# Patient Record
Sex: MALE | Race: OTHER | HISPANIC OR LATINO | Employment: UNEMPLOYED | URBAN - METROPOLITAN AREA
[De-identification: names, ages, dates, MRNs, and addresses within clinical notes are randomized per-mention and may not be internally consistent; named-entity substitution may affect disease eponyms.]

---

## 2023-03-02 ENCOUNTER — APPOINTMENT (OUTPATIENT)
Dept: CT IMAGING | Facility: HOSPITAL | Age: 63
End: 2023-03-02
Payer: MEDICAID

## 2023-03-02 ENCOUNTER — APPOINTMENT (OUTPATIENT)
Dept: GENERAL RADIOLOGY | Facility: HOSPITAL | Age: 63
End: 2023-03-02
Payer: MEDICAID

## 2023-03-02 ENCOUNTER — HOSPITAL ENCOUNTER (EMERGENCY)
Facility: HOSPITAL | Age: 63
Discharge: HOME | End: 2023-03-02
Attending: EMERGENCY MEDICINE | Admitting: EMERGENCY MEDICINE
Payer: MEDICAID

## 2023-03-02 VITALS
RESPIRATION RATE: 16 BRPM | SYSTOLIC BLOOD PRESSURE: 112 MMHG | HEART RATE: 82 BPM | WEIGHT: 131.17 LBS | TEMPERATURE: 97.7 F | DIASTOLIC BLOOD PRESSURE: 66 MMHG | OXYGEN SATURATION: 99 % | BODY MASS INDEX: 21.85 KG/M2 | HEIGHT: 65 IN

## 2023-03-02 DIAGNOSIS — R07.9 CHEST PAIN, UNSPECIFIED TYPE: Primary | ICD-10-CM

## 2023-03-02 LAB
ALBUMIN SERPL-MCNC: 4.7 G/DL (ref 3.5–5.2)
ALBUMIN/GLOB SERPL: 2 G/DL
ALP SERPL-CCNC: 50 U/L (ref 39–117)
ALT SERPL W P-5'-P-CCNC: 30 U/L (ref 1–41)
ANION GAP SERPL CALCULATED.3IONS-SCNC: 20.1 MMOL/L (ref 5–15)
ANISOCYTOSIS BLD QL: NORMAL
AST SERPL-CCNC: 49 U/L (ref 1–40)
BASOPHILS # BLD AUTO: 0.02 10*3/MM3 (ref 0–0.2)
BASOPHILS NFR BLD AUTO: 0.3 % (ref 0–1.5)
BILIRUB SERPL-MCNC: 0.9 MG/DL (ref 0–1.2)
BUN SERPL-MCNC: 6 MG/DL (ref 8–23)
BUN/CREAT SERPL: 10.9 (ref 7–25)
CALCIUM SPEC-SCNC: 8.5 MG/DL (ref 8.6–10.5)
CHLORIDE SERPL-SCNC: 93 MMOL/L (ref 98–107)
CO2 SERPL-SCNC: 18.9 MMOL/L (ref 22–29)
CREAT SERPL-MCNC: 0.55 MG/DL (ref 0.76–1.27)
D DIMER PPP FEU-MCNC: 2.3 MCGFEU/ML (ref 0–0.63)
DEPRECATED RDW RBC AUTO: 63.9 FL (ref 37–54)
EGFRCR SERPLBLD CKD-EPI 2021: 111.4 ML/MIN/1.73
EOSINOPHIL # BLD AUTO: 0.1 10*3/MM3 (ref 0–0.4)
EOSINOPHIL NFR BLD AUTO: 1.4 % (ref 0.3–6.2)
ERYTHROCYTE [DISTWIDTH] IN BLOOD BY AUTOMATED COUNT: 13.5 % (ref 12.3–15.4)
GEN 5 2HR TROPONIN T REFLEX: 6 NG/L
GLOBULIN UR ELPH-MCNC: 2.3 GM/DL
GLUCOSE SERPL-MCNC: 77 MG/DL (ref 65–99)
HCT VFR BLD AUTO: 28 % (ref 37.5–51)
HGB BLD-MCNC: 11.2 G/DL (ref 13–17.7)
HOLD SPECIMEN: NORMAL
HOLD SPECIMEN: NORMAL
IMM GRANULOCYTES # BLD AUTO: 0.02 10*3/MM3 (ref 0–0.05)
IMM GRANULOCYTES NFR BLD AUTO: 0.3 % (ref 0–0.5)
LIPASE SERPL-CCNC: 28 U/L (ref 13–60)
LYMPHOCYTES # BLD AUTO: 2 10*3/MM3 (ref 0.7–3.1)
LYMPHOCYTES NFR BLD AUTO: 28.8 % (ref 19.6–45.3)
MACROCYTES BLD QL SMEAR: NORMAL
MAGNESIUM SERPL-MCNC: 1.9 MG/DL (ref 1.6–2.4)
MCH RBC QN AUTO: 51.6 PG (ref 26.6–33)
MCHC RBC AUTO-ENTMCNC: 40 G/DL (ref 31.5–35.7)
MCV RBC AUTO: 129 FL (ref 79–97)
MONOCYTES # BLD AUTO: 0.63 10*3/MM3 (ref 0.1–0.9)
MONOCYTES NFR BLD AUTO: 9.1 % (ref 5–12)
NEUTROPHILS NFR BLD AUTO: 4.17 10*3/MM3 (ref 1.7–7)
NEUTROPHILS NFR BLD AUTO: 60.1 % (ref 42.7–76)
NRBC BLD AUTO-RTO: 0.4 /100 WBC (ref 0–0.2)
NT-PROBNP SERPL-MCNC: <36 PG/ML (ref 0–900)
PLAT MORPH BLD: NORMAL
PLATELET # BLD AUTO: 124 10*3/MM3 (ref 140–450)
PMV BLD AUTO: 10.9 FL (ref 6–12)
POTASSIUM SERPL-SCNC: 3.9 MMOL/L (ref 3.5–5.2)
PROT SERPL-MCNC: 7 G/DL (ref 6–8.5)
RBC # BLD AUTO: 2.17 10*6/MM3 (ref 4.14–5.8)
SODIUM SERPL-SCNC: 132 MMOL/L (ref 136–145)
TROPONIN T DELTA: NORMAL
TROPONIN T SERPL HS-MCNC: <6 NG/L
WBC MORPH BLD: NORMAL
WBC NRBC COR # BLD: 6.94 10*3/MM3 (ref 3.4–10.8)
WHOLE BLOOD HOLD COAG: NORMAL
WHOLE BLOOD HOLD SPECIMEN: NORMAL

## 2023-03-02 PROCEDURE — 85025 COMPLETE CBC W/AUTO DIFF WBC: CPT

## 2023-03-02 PROCEDURE — 85379 FIBRIN DEGRADATION QUANT: CPT | Performed by: EMERGENCY MEDICINE

## 2023-03-02 PROCEDURE — 83690 ASSAY OF LIPASE: CPT

## 2023-03-02 PROCEDURE — 83735 ASSAY OF MAGNESIUM: CPT

## 2023-03-02 PROCEDURE — 36415 COLL VENOUS BLD VENIPUNCTURE: CPT

## 2023-03-02 PROCEDURE — 71045 X-RAY EXAM CHEST 1 VIEW: CPT

## 2023-03-02 PROCEDURE — 93010 ELECTROCARDIOGRAM REPORT: CPT | Performed by: INTERNAL MEDICINE

## 2023-03-02 PROCEDURE — 84484 ASSAY OF TROPONIN QUANT: CPT

## 2023-03-02 PROCEDURE — 71260 CT THORAX DX C+: CPT

## 2023-03-02 PROCEDURE — 0 IOHEXOL PER 1 ML: Performed by: EMERGENCY MEDICINE

## 2023-03-02 PROCEDURE — 80053 COMPREHEN METABOLIC PANEL: CPT

## 2023-03-02 PROCEDURE — 85007 BL SMEAR W/DIFF WBC COUNT: CPT

## 2023-03-02 PROCEDURE — 99284 EMERGENCY DEPT VISIT MOD MDM: CPT

## 2023-03-02 PROCEDURE — 93005 ELECTROCARDIOGRAM TRACING: CPT

## 2023-03-02 PROCEDURE — 83880 ASSAY OF NATRIURETIC PEPTIDE: CPT

## 2023-03-02 RX ORDER — SODIUM CHLORIDE 0.9 % (FLUSH) 0.9 %
10 SYRINGE (ML) INJECTION AS NEEDED
Status: DISCONTINUED | OUTPATIENT
Start: 2023-03-02 | End: 2023-03-03 | Stop reason: HOSPADM

## 2023-03-02 RX ADMIN — IOHEXOL 68 ML: 350 INJECTION, SOLUTION INTRAVENOUS at 20:21

## 2023-03-02 NOTE — ED PROVIDER NOTES
Time: 6:56 PM EST  Date of encounter:  3/2/2023  Independent Historian/Clinical History and Information was obtained by:   Patient  Chief Complaint: chest pain, SOB    History is limited by: N/A    History of Present Illness:  Patient is a 62 y.o. year old male who presents to the emergency department for evaluation of 6/10 chest pain, heaviness onset for an hour, shortness of breath. EMS gave dose of 1 nitro and 324 mg aspirin. Patient states has abnormal sensation, tingling in legs but better since arriving to ED. Patient denies nausea, vomiting, fever, chills, cough, sore throat. Patient denies heart problems and states no hx of aneurysm (EMS note was incorrect).Patient states all symptoms have now resolved.    History provided by:  Patient, friend and EMS personnel   used: Yes        Patient Care Team  Primary Care Provider: Provider, No Known    Past Medical History:     No Known Allergies  History reviewed. No pertinent past medical history.  History reviewed. No pertinent surgical history.  Family History   Problem Relation Age of Onset   • Heart attack Brother        Home Medications:  Prior to Admission medications    Not on File        Social History:   Social History     Tobacco Use   • Smoking status: Every Day     Packs/day: 0.50     Years: 50.00     Pack years: 25.00     Types: Cigarettes   • Smokeless tobacco: Never   Substance Use Topics   • Alcohol use: Yes     Alcohol/week: 42.0 standard drinks     Types: 42 Cans of beer per week   • Drug use: Not Currently         Review of Systems:  Review of Systems   Constitutional: Negative for activity change, chills, fatigue and unexpected weight change.   HENT: Negative for congestion, sinus pressure, sore throat and trouble swallowing.    Eyes: Negative for pain, discharge, redness and visual disturbance.   Respiratory: Positive for shortness of breath. Negative for cough, chest tightness and wheezing.    Cardiovascular: Positive for  "chest pain (heaviness). Negative for palpitations.   Gastrointestinal: Negative for abdominal pain, diarrhea, nausea and vomiting.   Endocrine: Negative for cold intolerance and polydipsia.   Genitourinary: Negative for dysuria, frequency, hematuria and urgency.   Musculoskeletal: Negative for arthralgias, joint swelling, neck pain and neck stiffness.   Skin: Negative for color change and rash.   Allergic/Immunologic: Negative for environmental allergies and immunocompromised state.   Neurological: Positive for numbness (tingling in legs). Negative for dizziness, weakness and light-headedness.   Hematological: Does not bruise/bleed easily.   Psychiatric/Behavioral: Negative for agitation, confusion, dysphoric mood and suicidal ideas.        Physical Exam:  /66   Pulse 82   Temp 97.7 °F (36.5 °C) (Oral)   Resp 16   Ht 165.1 cm (65\")   Wt 59.5 kg (131 lb 2.8 oz)   SpO2 99%   BMI 21.83 kg/m²     Physical Exam  Vitals and nursing note reviewed.   Constitutional:       General: He is not in acute distress.  HENT:      Head: Normocephalic and atraumatic.      Right Ear: External ear normal.      Left Ear: External ear normal.      Nose: Nose normal.      Mouth/Throat:      Mouth: Mucous membranes are moist.   Eyes:      Extraocular Movements: Extraocular movements intact.      Conjunctiva/sclera: Conjunctivae normal.      Pupils: Pupils are equal, round, and reactive to light.   Cardiovascular:      Rate and Rhythm: Normal rate and regular rhythm.      Pulses: Normal pulses.      Heart sounds: Normal heart sounds.   Pulmonary:      Effort: Pulmonary effort is normal.      Breath sounds: Normal breath sounds.   Abdominal:      General: Bowel sounds are normal. There is no distension.      Palpations: Abdomen is soft.   Musculoskeletal:         General: Normal range of motion.      Cervical back: Neck supple.   Neurological:      Mental Status: He is alert and oriented to person, place, and time.   Psychiatric: "         Mood and Affect: Mood normal.         Behavior: Behavior normal.                  Procedures:  Procedures  EKG fingdings: sinus rhythm 75 BPM, normal P-waves, normal DC-interval, normal QRS with left axis deviation, normal ST-segments, normal QT, QTC  Comparison:  none    I interpreted the findings of this study.      Medical Decision Making:      Comorbidities that affect care:    Smoking    External Notes reviewed:    None      The following orders were placed and all results were independently analyzed by me:  Orders Placed This Encounter   Procedures   • XR Chest 1 View   • CT Chest With Contrast Diagnostic   • [place] Draw   • High Sensitivity Troponin T   • Comprehensive Metabolic Panel   • Lipase   • BNP   • Magnesium   • CBC Auto Differential   • Scan Slide   • High Sensitivity Troponin T 2Hr   • D-dimer, Quantitative   • Undress & Gown   • Continuous Pulse Oximetry   • ECG 12 Lead ED Triage Standing Order; Chest Pain   • CBC & Differential   • Green Top (Gel)   • Lavender Top   • Gold Top - SST   • Light Blue Top       Medications Given in the Emergency Department:  Medications   iohexol (OMNIPAQUE) 350 MG/ML injection 100 mL (68 mL Intravenous Given 3/2/23 2021)        ED Course:         Labs:    Lab Results (last 24 hours)     Procedure Component Value Units Date/Time    High Sensitivity Troponin T [[id number]]  (Normal) Collected: 03/02/23 1706    Specimen: Blood from Arm, Left Updated: 03/02/23 1736     HS Troponin T <6 ng/L     Narrative:      High Sensitive Troponin T Reference Range:  <10.0 ng/L- Negative Female for AMI  <15.0 ng/L- Negative Male for AMI  >=10 - Abnormal Female indicating possible myocardial injury.  >=15 - Abnormal Male indicating possible myocardial injury.   Clinicians would have to utilize clinical acumen, EKG, Troponin, and serial changes to determine if it is an Acute Myocardial Infarction or myocardial injury due to an underlying chronic condition.         CBC &  Differential [[id number]]  (Abnormal) Collected: 03/02/23 1706    Specimen: Blood from Arm, Left Updated: 03/02/23 1587    Narrative:      The following orders were created for panel order CBC & Differential.  Procedure                               Abnormality         Status                     ---------                               -----------         ------                     CBC Auto Differential[[id number]]        Abnormal            Final result               Scan Slide[[id number]]                                       Final result                 Please view results for these tests on the individual orders.    Comprehensive Metabolic Panel [[id number]]  (Abnormal) Collected: 03/02/23 1706    Specimen: Blood from Arm, Left Updated: 03/02/23 1736     Glucose 77 mg/dL      BUN 6 mg/dL      Creatinine 0.55 mg/dL      Sodium 132 mmol/L      Potassium 3.9 mmol/L      Chloride 93 mmol/L      CO2 18.9 mmol/L      Calcium 8.5 mg/dL      Total Protein 7.0 g/dL      Albumin 4.7 g/dL      ALT (SGPT) 30 U/L      AST (SGOT) 49 U/L      Alkaline Phosphatase 50 U/L      Total Bilirubin 0.9 mg/dL      Globulin 2.3 gm/dL      A/G Ratio 2.0 g/dL      BUN/Creatinine Ratio 10.9     Anion Gap 20.1 mmol/L      eGFR 111.4 mL/min/1.73     Narrative:      GFR Normal >60  Chronic Kidney Disease <60  Kidney Failure <15      Lipase [[id number]]  (Normal) Collected: 03/02/23 1706    Specimen: Blood from Arm, Left Updated: 03/02/23 1736     Lipase 28 U/L     BNP [[id number]]  (Normal) Collected: 03/02/23 1706    Specimen: Blood from Arm, Left Updated: 03/02/23 1734     proBNP <36.0 pg/mL     Narrative:      Among patients with dyspnea, NT-proBNP is highly sensitive for the detection of acute congestive heart failure. In addition NT-proBNP of <300 pg/ml effectively rules out acute congestive heart failure with 99% negative predictive value.      Magnesium [[id number]]  (Normal) Collected: 03/02/23 1706    Specimen: Blood from Arm, Left Updated:  03/02/23 1736     Magnesium 1.9 mg/dL     CBC Auto Differential [[id number]]  (Abnormal) Collected: 03/02/23 1706    Specimen: Blood from Arm, Left Updated: 03/02/23 1844     WBC 6.94 10*3/mm3      RBC 2.17 10*6/mm3      Hemoglobin 11.2 g/dL      Hematocrit 28.0 %      .0 fL      MCH 51.6 pg      MCHC 40.0 g/dL      RDW 13.5 %      RDW-SD 63.9 fl      MPV 10.9 fL      Platelets 124 10*3/mm3      Neutrophil % 60.1 %      Lymphocyte % 28.8 %      Monocyte % 9.1 %      Eosinophil % 1.4 %      Basophil % 0.3 %      Immature Grans % 0.3 %      Neutrophils, Absolute 4.17 10*3/mm3      Lymphocytes, Absolute 2.00 10*3/mm3      Monocytes, Absolute 0.63 10*3/mm3      Eosinophils, Absolute 0.10 10*3/mm3      Basophils, Absolute 0.02 10*3/mm3      Immature Grans, Absolute 0.02 10*3/mm3      nRBC 0.4 /100 WBC     Scan Slide [[id number]] Collected: 03/02/23 1706    Specimen: Blood from Arm, Left Updated: 03/02/23 1845     Anisocytosis Slight/1+     Macrocytes Slight/1+     WBC Morphology Normal     Platelet Morphology Normal    D-dimer, Quantitative [[id number]]  (Abnormal) Collected: 03/02/23 1706    Specimen: Blood from Arm, Left Updated: 03/02/23 1930     D-Dimer, Quantitative 2.30 MCGFEU/mL     Narrative:      According to the assay 's published package insert, a normal (<0.50 MCGFEU/mL) D-dimer result in conjunction with a non-high clinical probability assessment, excludes deep vein thrombosis (DVT) and pulmonary embolism (PE) with high sensitivity.    D-dimer values increase with age and this can make VTE exclusion of an older population difficult. To address this, the [employer] College [employer], based on best available evidence and recent guidelines, recommends that clinicians use age-adjusted D-dimer thresholds in patients greater than 50 years of age with: a) a low probability of PE who do not meet all Pulmonary Embolism Rule Out Criteria, or b) in those with intermediate probability of PE.   The  "formula for an age-adjusted D-dimer cut-off is \"age/100\".  For example, a 60 year old patient would have an age-adjusted cut-off of 0.60 MCGFEU/mL and an 80 year old 0.80 MCGFEU/mL.    High Sensitivity Troponin T 2Hr [[id number]] Collected: 03/02/23 1921    Specimen: Blood from Arm, Right Updated: 03/02/23 1955     HS Troponin T 6 ng/L      Troponin T Delta --     Comment: Unable to calculate.       Narrative:      High Sensitive Troponin T Reference Range:  <10.0 ng/L- Negative Female for AMI  <15.0 ng/L- Negative Male for AMI  >=10 - Abnormal Female indicating possible myocardial injury.  >=15 - Abnormal Male indicating possible myocardial injury.   Clinicians would have to utilize clinical acumen, EKG, Troponin, and serial changes to determine if it is an Acute Myocardial Infarction or myocardial injury due to an underlying chronic condition.              EKG:    Sinus rhythm with a rate of 75 beats per  Normal P wave and WI interval  Normal QRS with left axis deviation  Normal ST segments  Normal QT/QTc interval.      Imaging:    CT Chest With Contrast Diagnostic    Result Date: 3/2/2023  PROCEDURE: CT CHEST W CONTRAST DIAGNOSTIC  COMPARISON: .[facility] [place], CR, XR CHEST 1 VW, 3/02/2023, 18:07.  INDICATIONS: CHEST PAIN X TODAY.  TECHNIQUE: After obtaining the patient's consent, 795 CT/CTA images were obtained with non-ionic intravenous contrast material.  There is slight motion artifact on the exam.  PROTOCOL:   Standard pulmonary arteriogram CTA imaging protocol performed    RADIATION:   Total DLP: 203.5 mGy*cm.   Automated exposure control was utilized to minimize radiation dose. CONTRAST: 100 mL Omnipaque 350 I.V.  FINDINGS:  LUNGS: No acute infiltrate.  No suspicious pulmonary nodules.  Mild subsegmental atelectasis is seen bilaterally. VASCULATURE: No pulmonary embolism.  Positive coronary artery calcifications are noted.  ROSALIE: No enlarged hilar lymph nodes.  MEDIASTINUM: No enlarged mediastinal " lymph nodes.  CARDIAC: No cardiac enlargement.  No pericardial effusion. AORTA: No thoracic aortic aneurysm or dissection.  PLEURA: No pleural effusion.  No pneumothorax. CHEST WALL: No mass or axillary adenopathy.  No subcutaneous emphysema.  No focal fluid collection.  LIMITED ABDOMEN: No acute findings are seen in the partially imaged upper abdomen.  There may be a small hiatal hernia.  BONES: No acute fracture.  No aggressive osseous lesion.  Mild lateral curvature involves the thoracic spine with the convexity to the right, which may be fixed or positional in nature.  There is an old healed mid sternal fracture. OTHER: The central tracheobronchial tree is well aerated without filling defect.  There may be tiny calcified thyroid nodules.        1. No pulmonary embolism.  2. Coronary artery calcifications are present.  3. No acute infiltrate.  4.   Please see above comments for further detail.   Please note that portions of this note were completed with a voice recognition program.  AGNES RIVER JR, MD       Electronically Signed and Approved By: AGNES RIVER JR, MD on 3/02/2023 at 21:31              XR Chest 1 View    Result Date: 3/2/2023  PROCEDURE: XR CHEST 1 VW  COMPARISON: None  INDICATIONS: PT STATES HE HAD CHEST PAIN EARLIER TODAY BUT HE FEELS BETTER NOW, CURRENT SMOKER X 45 YRS, NO Hx CANCER  FINDINGS:  LUNGS: Normal.  No significant pulmonary parenchymal abnormalities.  VASCULATURE: Normal.  Unremarkable pulmonary vasculature.  CARDIAC: Normal.  No cardiac silhouette abnormality or cardiomegaly.  MEDIASTINUM: Normal.  No visible mass or adenopathy.  PLEURA: Normal.  No effusion or pleural thickening.  BONES: Normal.  No fracture or visible bony lesion.  OTHER: Negative.        No acute cardiopulmonary process identified.       PILY ACEVEDO MD       Electronically Signed and Approved By: PILY ACEVEDO MD on 3/02/2023 at 19:05                 Differential Diagnosis and Discussion:    Chest  Pain:  Based on the patient's signs and symptoms, I considered aortic dissection, myocardial infaction, pulmonary embolism, cardiac tamponade, pericarditis, pneumothorax, musculoskeletal chest pain and other differential diagnosis as an etiology of the patient's chest pain.     All labs were reviewed and interpreted by me.  EKG was interpreted by me.    MDM  Number of Diagnoses or Management Options  Chest pain, unspecified type  Diagnosis management comments: After a thorough review of the patient's history along with an  the patient has never had a history of an aneurysm.  The patient CT does not reveal any acute findings       Amount and/or Complexity of Data Reviewed  Clinical lab tests: reviewed  Tests in the radiology section of CPT®: reviewed  Tests in the medicine section of CPT®: reviewed  Decide to obtain previous medical records or to obtain history from someone other than the patient: yes  Obtain history from someone other than the patient: yes  Review and summarize past medical records: yes  Independent visualization of images, tracings, or specimens: yes           Patient Care Considerations:    All appropriate tests were ordered      Consultants/Shared Management Plan:    None    Social Determinants of Health:    Patient has presented with family members who are responsible, reliable and will ensure follow up care.      Disposition and Care Coordination:    Discharged: The patient is suitable and stable for discharge with no need for consideration of observation or admission.    I have explained discharge medications and the need for follow up with the patient/caretakers. This was also printed in the discharge instructions. Patient was discharged with the following medications and follow up:      Medication List      No changes were made to your prescriptions during this visit.      Your primary care provider    In 2 days         Final diagnoses:   Chest pain, unspecified type        ED  Disposition     ED Disposition   Discharge    Condition   Stable    Comment   --             This medical record created using voice recognition software.        Documentation assistance provided by Ruma Silva acting as scribe for No att. providers found. Information recorded by the scribe was done at my direction and has been verified and validated by me.          Ruma Silva  03/02/23 1912       Ruma Silva  03/02/23 1915       Napoleon Vaughan,   03/03/23 0006       Napoleon Vaughan,   03/03/23 0006

## 2023-03-03 NOTE — DISCHARGE INSTRUCTIONS
Drink plenty of fluids.  Do not smoke.  Return for worsening symptoms.  Follow-up with doctor in 2 days

## 2023-03-09 LAB — QT INTERVAL: 401 MS

## 2023-03-12 LAB — QT INTERVAL: 406 MS

## 2023-12-08 ENCOUNTER — HOSPITAL ENCOUNTER (INPATIENT)
Facility: HOSPITAL | Age: 63
LOS: 2 days | Discharge: HOME | DRG: 392 | End: 2023-12-11
Attending: EMERGENCY MEDICINE | Admitting: FAMILY MEDICINE
Payer: MEDICAID

## 2023-12-08 DIAGNOSIS — E53.8 B12 DEFICIENCY: ICD-10-CM

## 2023-12-08 DIAGNOSIS — K29.01 ACUTE GASTRITIS WITH HEMORRHAGE, UNSPECIFIED GASTRITIS TYPE: ICD-10-CM

## 2023-12-08 DIAGNOSIS — R26.2 DIFFICULTY IN WALKING: ICD-10-CM

## 2023-12-08 DIAGNOSIS — D64.9 SYMPTOMATIC ANEMIA: Primary | ICD-10-CM

## 2023-12-08 LAB
ABO GROUP BLD: NORMAL
ABO GROUP BLD: NORMAL
ALBUMIN SERPL-MCNC: 4.4 G/DL (ref 3.5–5.2)
ALBUMIN/GLOB SERPL: 1.9 G/DL
ALP SERPL-CCNC: 66 U/L (ref 39–117)
ALT SERPL W P-5'-P-CCNC: 34 U/L (ref 1–41)
ANION GAP SERPL CALCULATED.3IONS-SCNC: 11.8 MMOL/L (ref 5–15)
ANISOCYTOSIS BLD QL: NORMAL
APTT PPP: 33.4 SECONDS (ref 78–95.9)
AST SERPL-CCNC: 60 U/L (ref 1–40)
BACTERIA UR QL AUTO: ABNORMAL /HPF
BASOPHILS # BLD AUTO: 0 10*3/MM3 (ref 0–0.2)
BASOPHILS NFR BLD AUTO: 0 % (ref 0–1.5)
BILIRUB SERPL-MCNC: 2.2 MG/DL (ref 0–1.2)
BILIRUB UR QL STRIP: ABNORMAL
BLD GP AB SCN SERPL QL: NEGATIVE
BUN SERPL-MCNC: 10 MG/DL (ref 8–23)
BUN/CREAT SERPL: 13.9 (ref 7–25)
CALCIUM SPEC-SCNC: 8.7 MG/DL (ref 8.6–10.5)
CHLORIDE SERPL-SCNC: 96 MMOL/L (ref 98–107)
CLARITY UR: CLEAR
CO2 SERPL-SCNC: 23.2 MMOL/L (ref 22–29)
COLOR UR: ABNORMAL
CREAT SERPL-MCNC: 0.72 MG/DL (ref 0.76–1.27)
D-LACTATE SERPL-SCNC: 1.1 MMOL/L (ref 0.5–2)
DEPRECATED RDW RBC AUTO: 103.4 FL (ref 37–54)
EGFRCR SERPLBLD CKD-EPI 2021: 102 ML/MIN/1.73
EOSINOPHIL # BLD AUTO: 0.09 10*3/MM3 (ref 0–0.4)
EOSINOPHIL NFR BLD AUTO: 2 % (ref 0.3–6.2)
ERYTHROCYTE [DISTWIDTH] IN BLOOD BY AUTOMATED COUNT: 24.7 % (ref 12.3–15.4)
FLUAV SUBTYP SPEC NAA+PROBE: NOT DETECTED
FLUBV RNA ISLT QL NAA+PROBE: NOT DETECTED
GLOBULIN UR ELPH-MCNC: 2.3 GM/DL
GLUCOSE SERPL-MCNC: 115 MG/DL (ref 65–99)
GLUCOSE UR STRIP-MCNC: NEGATIVE MG/DL
HCT VFR BLD AUTO: 11.8 % (ref 37.5–51)
HCT VFR BLD AUTO: 11.8 % (ref 37.5–51)
HGB BLD-MCNC: 4.1 G/DL (ref 13–17.7)
HGB BLD-MCNC: 4.1 G/DL (ref 13–17.7)
HGB UR QL STRIP.AUTO: NEGATIVE
HOLD SPECIMEN: NORMAL
HOLD SPECIMEN: NORMAL
HYALINE CASTS UR QL AUTO: ABNORMAL /LPF
IMM GRANULOCYTES # BLD AUTO: 0.11 10*3/MM3 (ref 0–0.05)
IMM GRANULOCYTES NFR BLD AUTO: 2.5 % (ref 0–0.5)
INR PPP: 1.13 (ref 0.86–1.15)
KETONES UR QL STRIP: ABNORMAL
LEUKOCYTE ESTERASE UR QL STRIP.AUTO: ABNORMAL
LIPASE SERPL-CCNC: 14 U/L (ref 13–60)
LYMPHOCYTES # BLD AUTO: 1.67 10*3/MM3 (ref 0.7–3.1)
LYMPHOCYTES NFR BLD AUTO: 38 % (ref 19.6–45.3)
MACROCYTES BLD QL SMEAR: NORMAL
MCH RBC QN AUTO: 47.7 PG (ref 26.6–33)
MCHC RBC AUTO-ENTMCNC: 34.7 G/DL (ref 31.5–35.7)
MCV RBC AUTO: 137.2 FL (ref 79–97)
MONOCYTES # BLD AUTO: 0.22 10*3/MM3 (ref 0.1–0.9)
MONOCYTES NFR BLD AUTO: 5 % (ref 5–12)
NEUTROPHILS NFR BLD AUTO: 2.31 10*3/MM3 (ref 1.7–7)
NEUTROPHILS NFR BLD AUTO: 52.5 % (ref 42.7–76)
NITRITE UR QL STRIP: NEGATIVE
NRBC BLD AUTO-RTO: 1.8 /100 WBC (ref 0–0.2)
OVALOCYTES BLD QL SMEAR: NORMAL
PH UR STRIP.AUTO: 6.5 [PH] (ref 5–8)
PLATELET # BLD AUTO: 71 10*3/MM3 (ref 140–450)
PMV BLD AUTO: ABNORMAL FL
POIKILOCYTOSIS BLD QL SMEAR: NORMAL
POTASSIUM SERPL-SCNC: 4 MMOL/L (ref 3.5–5.2)
PROT SERPL-MCNC: 6.7 G/DL (ref 6–8.5)
PROT UR QL STRIP: NEGATIVE
PROTHROMBIN TIME: 14.7 SECONDS (ref 11.8–14.9)
RBC # BLD AUTO: 0.86 10*6/MM3 (ref 4.14–5.8)
RBC # UR STRIP: ABNORMAL /HPF
REF LAB TEST METHOD: ABNORMAL
RH BLD: POSITIVE
RH BLD: POSITIVE
RSV RNA NPH QL NAA+NON-PROBE: NOT DETECTED
S PYO AG THROAT QL: NEGATIVE
SARS-COV-2 RNA RESP QL NAA+PROBE: NOT DETECTED
SCHISTOCYTES BLD QL SMEAR: NORMAL
SMALL PLATELETS BLD QL SMEAR: NORMAL
SODIUM SERPL-SCNC: 131 MMOL/L (ref 136–145)
SP GR UR STRIP: 1.02 (ref 1–1.03)
SQUAMOUS #/AREA URNS HPF: ABNORMAL /HPF
T&S EXPIRATION DATE: NORMAL
UROBILINOGEN UR QL STRIP: ABNORMAL
WBC # UR STRIP: ABNORMAL /HPF
WBC MORPH BLD: NORMAL
WBC NRBC COR # BLD AUTO: 4.4 10*3/MM3 (ref 3.4–10.8)
WHOLE BLOOD HOLD COAG: NORMAL
WHOLE BLOOD HOLD SPECIMEN: NORMAL

## 2023-12-08 PROCEDURE — 82746 ASSAY OF FOLIC ACID SERUM: CPT | Performed by: FAMILY MEDICINE

## 2023-12-08 PROCEDURE — 85018 HEMOGLOBIN: CPT

## 2023-12-08 PROCEDURE — 82607 VITAMIN B-12: CPT | Performed by: FAMILY MEDICINE

## 2023-12-08 PROCEDURE — 87880 STREP A ASSAY W/OPTIC: CPT

## 2023-12-08 PROCEDURE — 86923 COMPATIBILITY TEST ELECTRIC: CPT

## 2023-12-08 PROCEDURE — 36415 COLL VENOUS BLD VENIPUNCTURE: CPT

## 2023-12-08 PROCEDURE — 83605 ASSAY OF LACTIC ACID: CPT

## 2023-12-08 PROCEDURE — 86901 BLOOD TYPING SEROLOGIC RH(D): CPT | Performed by: EMERGENCY MEDICINE

## 2023-12-08 PROCEDURE — 85730 THROMBOPLASTIN TIME PARTIAL: CPT | Performed by: EMERGENCY MEDICINE

## 2023-12-08 PROCEDURE — 99285 EMERGENCY DEPT VISIT HI MDM: CPT

## 2023-12-08 PROCEDURE — 93005 ELECTROCARDIOGRAM TRACING: CPT | Performed by: EMERGENCY MEDICINE

## 2023-12-08 PROCEDURE — 81001 URINALYSIS AUTO W/SCOPE: CPT

## 2023-12-08 PROCEDURE — 83540 ASSAY OF IRON: CPT | Performed by: FAMILY MEDICINE

## 2023-12-08 PROCEDURE — 87081 CULTURE SCREEN ONLY: CPT | Performed by: EMERGENCY MEDICINE

## 2023-12-08 PROCEDURE — 99222 1ST HOSP IP/OBS MODERATE 55: CPT | Performed by: FAMILY MEDICINE

## 2023-12-08 PROCEDURE — 84466 ASSAY OF TRANSFERRIN: CPT | Performed by: FAMILY MEDICINE

## 2023-12-08 PROCEDURE — 85025 COMPLETE CBC W/AUTO DIFF WBC: CPT | Performed by: EMERGENCY MEDICINE

## 2023-12-08 PROCEDURE — 86850 RBC ANTIBODY SCREEN: CPT | Performed by: EMERGENCY MEDICINE

## 2023-12-08 PROCEDURE — 85014 HEMATOCRIT: CPT

## 2023-12-08 PROCEDURE — P9016 RBC LEUKOCYTES REDUCED: HCPCS

## 2023-12-08 PROCEDURE — 86900 BLOOD TYPING SEROLOGIC ABO: CPT

## 2023-12-08 PROCEDURE — 85610 PROTHROMBIN TIME: CPT | Performed by: EMERGENCY MEDICINE

## 2023-12-08 PROCEDURE — 86901 BLOOD TYPING SEROLOGIC RH(D): CPT

## 2023-12-08 PROCEDURE — 36430 TRANSFUSION BLD/BLD COMPNT: CPT

## 2023-12-08 PROCEDURE — 87637 SARSCOV2&INF A&B&RSV AMP PRB: CPT

## 2023-12-08 PROCEDURE — 86900 BLOOD TYPING SEROLOGIC ABO: CPT | Performed by: EMERGENCY MEDICINE

## 2023-12-08 PROCEDURE — 83690 ASSAY OF LIPASE: CPT

## 2023-12-08 PROCEDURE — 85045 AUTOMATED RETICULOCYTE COUNT: CPT | Performed by: FAMILY MEDICINE

## 2023-12-08 PROCEDURE — 82728 ASSAY OF FERRITIN: CPT | Performed by: FAMILY MEDICINE

## 2023-12-08 PROCEDURE — 80053 COMPREHEN METABOLIC PANEL: CPT

## 2023-12-08 PROCEDURE — 84484 ASSAY OF TROPONIN QUANT: CPT | Performed by: FAMILY MEDICINE

## 2023-12-08 PROCEDURE — 85007 BL SMEAR W/DIFF WBC COUNT: CPT | Performed by: EMERGENCY MEDICINE

## 2023-12-08 RX ORDER — PANTOPRAZOLE SODIUM 40 MG/10ML
80 INJECTION, POWDER, LYOPHILIZED, FOR SOLUTION INTRAVENOUS ONCE
Status: COMPLETED | OUTPATIENT
Start: 2023-12-08 | End: 2023-12-08

## 2023-12-08 RX ORDER — SODIUM CHLORIDE 0.9 % (FLUSH) 0.9 %
10 SYRINGE (ML) INJECTION AS NEEDED
Status: DISCONTINUED | OUTPATIENT
Start: 2023-12-08 | End: 2023-12-09

## 2023-12-08 RX ADMIN — PANTOPRAZOLE SODIUM 80 MG: 40 INJECTION, POWDER, FOR SOLUTION INTRAVENOUS at 23:22

## 2023-12-09 ENCOUNTER — APPOINTMENT (OUTPATIENT)
Dept: CT IMAGING | Facility: HOSPITAL | Age: 63
DRG: 392 | End: 2023-12-09
Payer: MEDICAID

## 2023-12-09 ENCOUNTER — ANESTHESIA EVENT (OUTPATIENT)
Dept: GASTROENTEROLOGY | Facility: HOSPITAL | Age: 63
End: 2023-12-09
Payer: MEDICAID

## 2023-12-09 ENCOUNTER — ANESTHESIA (OUTPATIENT)
Dept: GASTROENTEROLOGY | Facility: HOSPITAL | Age: 63
End: 2023-12-09
Payer: MEDICAID

## 2023-12-09 ENCOUNTER — ANESTHESIA (OUTPATIENT)
Dept: GASTROENTEROLOGY | Facility: HOSPITAL | Age: 63
DRG: 392 | End: 2023-12-09
Payer: MEDICAID

## 2023-12-09 ENCOUNTER — ANESTHESIA EVENT (OUTPATIENT)
Dept: GASTROENTEROLOGY | Facility: HOSPITAL | Age: 63
DRG: 392 | End: 2023-12-09
Payer: MEDICAID

## 2023-12-09 ENCOUNTER — PREP FOR SURGERY (OUTPATIENT)
Dept: OTHER | Facility: HOSPITAL | Age: 63
End: 2023-12-09
Payer: MEDICAID

## 2023-12-09 ENCOUNTER — APPOINTMENT (OUTPATIENT)
Dept: ULTRASOUND IMAGING | Facility: HOSPITAL | Age: 63
DRG: 392 | End: 2023-12-09
Payer: MEDICAID

## 2023-12-09 ENCOUNTER — APPOINTMENT (OUTPATIENT)
Dept: CARDIOLOGY | Facility: HOSPITAL | Age: 63
DRG: 392 | End: 2023-12-09
Payer: MEDICAID

## 2023-12-09 VITALS — SYSTOLIC BLOOD PRESSURE: 118 MMHG | HEART RATE: 82 BPM | DIASTOLIC BLOOD PRESSURE: 69 MMHG | OXYGEN SATURATION: 100 %

## 2023-12-09 DIAGNOSIS — D64.9 SYMPTOMATIC ANEMIA: Primary | ICD-10-CM

## 2023-12-09 LAB
ANION GAP SERPL CALCULATED.3IONS-SCNC: 11.7 MMOL/L (ref 5–15)
ANISOCYTOSIS BLD QL: ABNORMAL
ANISOCYTOSIS BLD QL: NORMAL
BASOPHILS # BLD AUTO: 0.01 10*3/MM3 (ref 0–0.2)
BASOPHILS NFR BLD AUTO: 0.2 % (ref 0–1.5)
BH CV ECHO MEAS - AO ROOT DIAM: 3.1 CM
BH CV ECHO MEAS - EDV(CUBED): 133.4 ML
BH CV ECHO MEAS - EDV(MOD-SP2): 81.1 ML
BH CV ECHO MEAS - EDV(MOD-SP4): 79.5 ML
BH CV ECHO MEAS - EF(MOD-BP): 62.8 %
BH CV ECHO MEAS - EF(MOD-SP2): 63.7 %
BH CV ECHO MEAS - EF(MOD-SP4): 57.2 %
BH CV ECHO MEAS - ESV(CUBED): 51.9 ML
BH CV ECHO MEAS - ESV(MOD-SP2): 29.4 ML
BH CV ECHO MEAS - ESV(MOD-SP4): 34 ML
BH CV ECHO MEAS - FS: 27 %
BH CV ECHO MEAS - IVS/LVPW: 0.7 CM
BH CV ECHO MEAS - IVSD: 0.53 CM
BH CV ECHO MEAS - LA DIMENSION: 3.4 CM
BH CV ECHO MEAS - LAT PEAK E' VEL: 10.8 CM/SEC
BH CV ECHO MEAS - LV MASS(C)D: 107 GRAMS
BH CV ECHO MEAS - LVIDD: 5.1 CM
BH CV ECHO MEAS - LVIDS: 3.7 CM
BH CV ECHO MEAS - LVOT AREA: 3.1 CM2
BH CV ECHO MEAS - LVOT DIAM: 2 CM
BH CV ECHO MEAS - LVPWD: 0.75 CM
BH CV ECHO MEAS - MED PEAK E' VEL: 8.2 CM/SEC
BH CV ECHO MEAS - MV A MAX VEL: 90.8 CM/SEC
BH CV ECHO MEAS - MV DEC SLOPE: 564 CM/SEC2
BH CV ECHO MEAS - MV DEC TIME: 0.18 SEC
BH CV ECHO MEAS - MV E MAX VEL: 98.5 CM/SEC
BH CV ECHO MEAS - MV E/A: 1.08
BH CV ECHO MEAS - RVDD: 2.6 CM
BH CV ECHO MEAS - SV(MOD-SP2): 51.7 ML
BH CV ECHO MEAS - SV(MOD-SP4): 45.5 ML
BH CV ECHO MEAS - TAPSE (>1.6): 2.6 CM
BH CV ECHO MEAS - TR MAX PG: 34.1 MMHG
BH CV ECHO MEAS - TR MAX VEL: 292 CM/SEC
BH CV ECHO MEASUREMENTS AVERAGE E/E' RATIO: 10.37
BUN SERPL-MCNC: 10 MG/DL (ref 8–23)
BUN/CREAT SERPL: 15.6 (ref 7–25)
CALCIUM SPEC-SCNC: 8.3 MG/DL (ref 8.6–10.5)
CHLORIDE SERPL-SCNC: 102 MMOL/L (ref 98–107)
CO2 SERPL-SCNC: 20.3 MMOL/L (ref 22–29)
CREAT SERPL-MCNC: 0.64 MG/DL (ref 0.76–1.27)
EGFRCR SERPLBLD CKD-EPI 2021: 105.7 ML/MIN/1.73
EOSINOPHIL # BLD AUTO: 0.08 10*3/MM3 (ref 0–0.4)
EOSINOPHIL NFR BLD AUTO: 1.9 % (ref 0.3–6.2)
EOSINOPHIL NFR BLD MANUAL: 2 % (ref 0.3–6.2)
ERYTHROCYTE [DISTWIDTH] IN BLOOD BY AUTOMATED COUNT: ABNORMAL %
FERRITIN SERPL-MCNC: 293.2 NG/ML (ref 30–400)
FOLATE SERPL-MCNC: 14.4 NG/ML (ref 4.78–24.2)
GLUCOSE SERPL-MCNC: 99 MG/DL (ref 65–99)
HAV IGM SERPL QL IA: NORMAL
HBV CORE IGM SERPL QL IA: NORMAL
HBV SURFACE AG SERPL QL IA: NORMAL
HCT VFR BLD AUTO: 27 % (ref 37.5–51)
HCT VFR BLD AUTO: 33.3 % (ref 37.5–51)
HCV AB SER DONR QL: NORMAL
HGB BLD-MCNC: 11.6 G/DL (ref 13–17.7)
HGB BLD-MCNC: 9.6 G/DL (ref 13–17.7)
IMM GRANULOCYTES # BLD AUTO: 0.08 10*3/MM3 (ref 0–0.05)
IMM GRANULOCYTES NFR BLD AUTO: 1.9 % (ref 0–0.5)
IRON 24H UR-MRATE: 193 MCG/DL (ref 59–158)
IRON SATN MFR SERPL: 64 % (ref 20–50)
IVRT: 63 MS
LEFT ATRIUM VOLUME INDEX: 25.6 ML/M2
LYMPHOCYTES # BLD AUTO: 1.39 10*3/MM3 (ref 0.7–3.1)
LYMPHOCYTES # BLD MANUAL: ABNORMAL 10*3/UL
LYMPHOCYTES NFR BLD AUTO: 33.2 % (ref 19.6–45.3)
LYMPHOCYTES NFR BLD MANUAL: 1 % (ref 5–12)
MACROCYTES BLD QL SMEAR: ABNORMAL
MACROCYTES BLD QL SMEAR: NORMAL
MAGNESIUM SERPL-MCNC: 1.9 MG/DL (ref 1.6–2.4)
MCH RBC QN AUTO: 34.4 PG (ref 26.6–33)
MCHC RBC AUTO-ENTMCNC: 35.6 G/DL (ref 31.5–35.7)
MCV RBC AUTO: 96.8 FL (ref 79–97)
MICROCYTES BLD QL: NORMAL
MONOCYTES # BLD AUTO: 0.15 10*3/MM3 (ref 0.1–0.9)
MONOCYTES NFR BLD AUTO: 3.6 % (ref 5–12)
NEUTROPHILS # BLD AUTO: ABNORMAL 10*3/UL
NEUTROPHILS NFR BLD AUTO: 2.48 10*3/MM3 (ref 1.7–7)
NEUTROPHILS NFR BLD AUTO: 59.2 % (ref 42.7–76)
NEUTROPHILS NFR BLD MANUAL: 42 % (ref 42.7–76)
NRBC BLD AUTO-RTO: 1.7 /100 WBC (ref 0–0.2)
OVALOCYTES BLD QL SMEAR: ABNORMAL
OVALOCYTES BLD QL SMEAR: NORMAL
PATHOLOGY REVIEW: YES
PLAT MORPH BLD: NORMAL
PLATELET # BLD AUTO: 72 10*3/MM3 (ref 140–450)
POIKILOCYTOSIS BLD QL SMEAR: ABNORMAL
POTASSIUM SERPL-SCNC: 4.3 MMOL/L (ref 3.5–5.2)
QT INTERVAL: 361 MS
QT INTERVAL: 364 MS
QTC INTERVAL: 442 MS
QTC INTERVAL: 445 MS
RBC # BLD AUTO: 2.79 10*6/MM3 (ref 4.14–5.8)
RETICS # AUTO: 0.02 10*6/MM3 (ref 0.02–0.13)
RETICS/RBC NFR AUTO: 2.01 % (ref 0.7–1.9)
SCHISTOCYTES BLD QL SMEAR: ABNORMAL
SMALL PLATELETS BLD QL SMEAR: NORMAL
SODIUM SERPL-SCNC: 134 MMOL/L (ref 136–145)
TIBC SERPL-MCNC: 302 MCG/DL (ref 298–536)
TRANSFERRIN SERPL-MCNC: 203 MG/DL (ref 200–360)
TROPONIN T SERPL HS-MCNC: 8 NG/L
VARIANT LYMPHS NFR BLD MANUAL: 55 % (ref 19.6–45.3)
VIT B12 BLD-MCNC: <150 PG/ML (ref 211–946)
WBC MORPH BLD: NORMAL
WBC MORPH BLD: NORMAL
WBC NRBC COR # BLD AUTO: 4.19 10*3/MM3 (ref 3.4–10.8)

## 2023-12-09 PROCEDURE — 99291 CRITICAL CARE FIRST HOUR: CPT | Performed by: INTERNAL MEDICINE

## 2023-12-09 PROCEDURE — 85007 BL SMEAR W/DIFF WBC COUNT: CPT | Performed by: INTERNAL MEDICINE

## 2023-12-09 PROCEDURE — 85014 HEMATOCRIT: CPT | Performed by: INTERNAL MEDICINE

## 2023-12-09 PROCEDURE — 25010000002 PROPOFOL 10 MG/ML EMULSION: Performed by: NURSE ANESTHETIST, CERTIFIED REGISTERED

## 2023-12-09 PROCEDURE — 86900 BLOOD TYPING SEROLOGIC ABO: CPT

## 2023-12-09 PROCEDURE — 25010000002 THIAMINE HCL 200 MG/2ML SOLUTION 2 ML VIAL: Performed by: INTERNAL MEDICINE

## 2023-12-09 PROCEDURE — 80048 BASIC METABOLIC PNL TOTAL CA: CPT | Performed by: FAMILY MEDICINE

## 2023-12-09 PROCEDURE — 36430 TRANSFUSION BLD/BLD COMPNT: CPT

## 2023-12-09 PROCEDURE — 25810000003 LACTATED RINGERS PER 1000 ML: Performed by: FAMILY MEDICINE

## 2023-12-09 PROCEDURE — P9016 RBC LEUKOCYTES REDUCED: HCPCS

## 2023-12-09 PROCEDURE — 85018 HEMOGLOBIN: CPT | Performed by: INTERNAL MEDICINE

## 2023-12-09 PROCEDURE — 93306 TTE W/DOPPLER COMPLETE: CPT | Performed by: INTERNAL MEDICINE

## 2023-12-09 PROCEDURE — 93005 ELECTROCARDIOGRAM TRACING: CPT | Performed by: FAMILY MEDICINE

## 2023-12-09 PROCEDURE — 85025 COMPLETE CBC W/AUTO DIFF WBC: CPT | Performed by: INTERNAL MEDICINE

## 2023-12-09 PROCEDURE — 74177 CT ABD & PELVIS W/CONTRAST: CPT

## 2023-12-09 PROCEDURE — 76705 ECHO EXAM OF ABDOMEN: CPT

## 2023-12-09 PROCEDURE — 0DB58ZX EXCISION OF ESOPHAGUS, VIA NATURAL OR ARTIFICIAL OPENING ENDOSCOPIC, DIAGNOSTIC: ICD-10-PCS | Performed by: INTERNAL MEDICINE

## 2023-12-09 PROCEDURE — 0DB78ZX EXCISION OF STOMACH, PYLORUS, VIA NATURAL OR ARTIFICIAL OPENING ENDOSCOPIC, DIAGNOSTIC: ICD-10-PCS | Performed by: INTERNAL MEDICINE

## 2023-12-09 PROCEDURE — 93306 TTE W/DOPPLER COMPLETE: CPT

## 2023-12-09 PROCEDURE — 25810000003 LACTATED RINGERS PER 1000 ML: Performed by: NURSE ANESTHETIST, CERTIFIED REGISTERED

## 2023-12-09 PROCEDURE — 25010000002 MIDAZOLAM PER 1MG: Performed by: NURSE ANESTHETIST, CERTIFIED REGISTERED

## 2023-12-09 PROCEDURE — 80074 ACUTE HEPATITIS PANEL: CPT | Performed by: FAMILY MEDICINE

## 2023-12-09 PROCEDURE — 25510000001 IOPAMIDOL PER 1 ML: Performed by: EMERGENCY MEDICINE

## 2023-12-09 PROCEDURE — 25010000002 CYANOCOBALAMIN PER 1000 MCG: Performed by: INTERNAL MEDICINE

## 2023-12-09 PROCEDURE — 83735 ASSAY OF MAGNESIUM: CPT | Performed by: FAMILY MEDICINE

## 2023-12-09 PROCEDURE — 88305 TISSUE EXAM BY PATHOLOGIST: CPT | Performed by: INTERNAL MEDICINE

## 2023-12-09 PROCEDURE — 25810000003 LACTATED RINGERS SOLUTION: Performed by: FAMILY MEDICINE

## 2023-12-09 PROCEDURE — 86923 COMPATIBILITY TEST ELECTRIC: CPT

## 2023-12-09 PROCEDURE — 88342 IMHCHEM/IMCYTCHM 1ST ANTB: CPT | Performed by: INTERNAL MEDICINE

## 2023-12-09 RX ORDER — METHION/INOS/CHOL BT/B COM/LIV 110MG-86MG
100 CAPSULE ORAL DAILY
Status: DISCONTINUED | OUTPATIENT
Start: 2023-12-16 | End: 2023-12-11 | Stop reason: HOSPADM

## 2023-12-09 RX ORDER — NICOTINE 21 MG/24HR
1 PATCH, TRANSDERMAL 24 HOURS TRANSDERMAL
Status: DISCONTINUED | OUTPATIENT
Start: 2023-12-09 | End: 2023-12-09 | Stop reason: SDUPTHER

## 2023-12-09 RX ORDER — ONDANSETRON 2 MG/ML
4 INJECTION INTRAMUSCULAR; INTRAVENOUS EVERY 6 HOURS PRN
Status: DISCONTINUED | OUTPATIENT
Start: 2023-12-09 | End: 2023-12-11 | Stop reason: HOSPADM

## 2023-12-09 RX ORDER — MULTIPLE VITAMINS W/ MINERALS TAB 9MG-400MCG
1 TAB ORAL DAILY
Status: DISCONTINUED | OUTPATIENT
Start: 2023-12-10 | End: 2023-12-11 | Stop reason: HOSPADM

## 2023-12-09 RX ORDER — SODIUM CHLORIDE, SODIUM LACTATE, POTASSIUM CHLORIDE, CALCIUM CHLORIDE 600; 310; 30; 20 MG/100ML; MG/100ML; MG/100ML; MG/100ML
9 INJECTION, SOLUTION INTRAVENOUS CONTINUOUS PRN
Status: DISCONTINUED | OUTPATIENT
Start: 2023-12-09 | End: 2023-12-09 | Stop reason: HOSPADM

## 2023-12-09 RX ORDER — CYANOCOBALAMIN 1000 UG/ML
1000 INJECTION, SOLUTION INTRAMUSCULAR; SUBCUTANEOUS ONCE
Status: COMPLETED | OUTPATIENT
Start: 2023-12-09 | End: 2023-12-09

## 2023-12-09 RX ORDER — ALUMINA, MAGNESIA, AND SIMETHICONE 2400; 2400; 240 MG/30ML; MG/30ML; MG/30ML
15 SUSPENSION ORAL EVERY 6 HOURS PRN
Status: DISCONTINUED | OUTPATIENT
Start: 2023-12-09 | End: 2023-12-11 | Stop reason: HOSPADM

## 2023-12-09 RX ORDER — PROPOFOL 10 MG/ML
VIAL (ML) INTRAVENOUS AS NEEDED
Status: DISCONTINUED | OUTPATIENT
Start: 2023-12-09 | End: 2023-12-09 | Stop reason: SURG

## 2023-12-09 RX ORDER — SODIUM CHLORIDE 9 MG/ML
40 INJECTION, SOLUTION INTRAVENOUS AS NEEDED
Status: DISCONTINUED | OUTPATIENT
Start: 2023-12-09 | End: 2023-12-11 | Stop reason: HOSPADM

## 2023-12-09 RX ORDER — POLYETHYLENE GLYCOL 3350 17 G/17G
17 POWDER, FOR SOLUTION ORAL DAILY PRN
Status: DISCONTINUED | OUTPATIENT
Start: 2023-12-09 | End: 2023-12-11 | Stop reason: HOSPADM

## 2023-12-09 RX ORDER — BISACODYL 5 MG/1
5 TABLET, DELAYED RELEASE ORAL DAILY PRN
Status: DISCONTINUED | OUTPATIENT
Start: 2023-12-09 | End: 2023-12-11 | Stop reason: HOSPADM

## 2023-12-09 RX ORDER — MIDAZOLAM HYDROCHLORIDE 2 MG/2ML
2 INJECTION, SOLUTION INTRAMUSCULAR; INTRAVENOUS ONCE
Status: DISCONTINUED | OUTPATIENT
Start: 2023-12-09 | End: 2023-12-09 | Stop reason: HOSPADM

## 2023-12-09 RX ORDER — SODIUM CHLORIDE, SODIUM LACTATE, POTASSIUM CHLORIDE, CALCIUM CHLORIDE 600; 310; 30; 20 MG/100ML; MG/100ML; MG/100ML; MG/100ML
50 INJECTION, SOLUTION INTRAVENOUS CONTINUOUS
Status: DISCONTINUED | OUTPATIENT
Start: 2023-12-09 | End: 2023-12-09

## 2023-12-09 RX ORDER — THIAMINE HYDROCHLORIDE 100 MG/ML
200 INJECTION, SOLUTION INTRAMUSCULAR; INTRAVENOUS EVERY 8 HOURS SCHEDULED
Status: DISCONTINUED | OUTPATIENT
Start: 2023-12-12 | End: 2023-12-11 | Stop reason: HOSPADM

## 2023-12-09 RX ORDER — NICOTINE 21 MG/24HR
1 PATCH, TRANSDERMAL 24 HOURS TRANSDERMAL
Status: DISCONTINUED | OUTPATIENT
Start: 2023-12-09 | End: 2023-12-11 | Stop reason: HOSPADM

## 2023-12-09 RX ORDER — PANTOPRAZOLE SODIUM 40 MG/10ML
40 INJECTION, POWDER, LYOPHILIZED, FOR SOLUTION INTRAVENOUS
Status: DISCONTINUED | OUTPATIENT
Start: 2023-12-09 | End: 2023-12-09

## 2023-12-09 RX ORDER — SODIUM CHLORIDE 0.9 % (FLUSH) 0.9 %
10 SYRINGE (ML) INJECTION AS NEEDED
Status: DISCONTINUED | OUTPATIENT
Start: 2023-12-09 | End: 2023-12-11 | Stop reason: HOSPADM

## 2023-12-09 RX ORDER — MIDAZOLAM HYDROCHLORIDE 2 MG/2ML
INJECTION, SOLUTION INTRAMUSCULAR; INTRAVENOUS AS NEEDED
Status: DISCONTINUED | OUTPATIENT
Start: 2023-12-09 | End: 2023-12-09 | Stop reason: SURG

## 2023-12-09 RX ORDER — SODIUM CHLORIDE, SODIUM LACTATE, POTASSIUM CHLORIDE, CALCIUM CHLORIDE 600; 310; 30; 20 MG/100ML; MG/100ML; MG/100ML; MG/100ML
INJECTION, SOLUTION INTRAVENOUS CONTINUOUS PRN
Status: DISCONTINUED | OUTPATIENT
Start: 2023-12-09 | End: 2023-12-09 | Stop reason: SURG

## 2023-12-09 RX ORDER — ACETAMINOPHEN 650 MG/1
650 SUPPOSITORY RECTAL EVERY 4 HOURS PRN
Status: DISCONTINUED | OUTPATIENT
Start: 2023-12-09 | End: 2023-12-11 | Stop reason: HOSPADM

## 2023-12-09 RX ORDER — CHOLECALCIFEROL (VITAMIN D3) 125 MCG
1000 CAPSULE ORAL DAILY
Status: DISCONTINUED | OUTPATIENT
Start: 2023-12-10 | End: 2023-12-11 | Stop reason: HOSPADM

## 2023-12-09 RX ORDER — ACETAMINOPHEN 325 MG/1
650 TABLET ORAL EVERY 4 HOURS PRN
Status: DISCONTINUED | OUTPATIENT
Start: 2023-12-09 | End: 2023-12-11 | Stop reason: HOSPADM

## 2023-12-09 RX ORDER — LIDOCAINE 4 G/G
1 PATCH TOPICAL DAILY PRN
Status: DISCONTINUED | OUTPATIENT
Start: 2023-12-09 | End: 2023-12-11 | Stop reason: HOSPADM

## 2023-12-09 RX ORDER — ACETAMINOPHEN 500 MG
1000 TABLET ORAL ONCE
Status: DISCONTINUED | OUTPATIENT
Start: 2023-12-09 | End: 2023-12-09 | Stop reason: HOSPADM

## 2023-12-09 RX ORDER — NITROGLYCERIN 0.4 MG/1
0.4 TABLET SUBLINGUAL
Status: DISCONTINUED | OUTPATIENT
Start: 2023-12-09 | End: 2023-12-11 | Stop reason: HOSPADM

## 2023-12-09 RX ORDER — CHOLECALCIFEROL (VITAMIN D3) 125 MCG
5 CAPSULE ORAL NIGHTLY PRN
Status: DISCONTINUED | OUTPATIENT
Start: 2023-12-09 | End: 2023-12-11 | Stop reason: HOSPADM

## 2023-12-09 RX ORDER — FOLIC ACID 1 MG/1
1 TABLET ORAL DAILY
Status: DISCONTINUED | OUTPATIENT
Start: 2023-12-09 | End: 2023-12-11 | Stop reason: HOSPADM

## 2023-12-09 RX ORDER — BISACODYL 10 MG
10 SUPPOSITORY, RECTAL RECTAL DAILY PRN
Status: DISCONTINUED | OUTPATIENT
Start: 2023-12-09 | End: 2023-12-11 | Stop reason: HOSPADM

## 2023-12-09 RX ORDER — NICOTINE 21 MG/24HR
1 PATCH, TRANSDERMAL 24 HOURS TRANSDERMAL DAILY PRN
Status: DISCONTINUED | OUTPATIENT
Start: 2023-12-09 | End: 2023-12-09 | Stop reason: SDUPTHER

## 2023-12-09 RX ORDER — AMOXICILLIN 250 MG
2 CAPSULE ORAL 2 TIMES DAILY
Status: DISCONTINUED | OUTPATIENT
Start: 2023-12-09 | End: 2023-12-11 | Stop reason: HOSPADM

## 2023-12-09 RX ORDER — PANTOPRAZOLE SODIUM 40 MG/1
40 TABLET, DELAYED RELEASE ORAL
Status: DISCONTINUED | OUTPATIENT
Start: 2023-12-10 | End: 2023-12-11 | Stop reason: HOSPADM

## 2023-12-09 RX ORDER — HYDROXYZINE HYDROCHLORIDE 25 MG/1
25 TABLET, FILM COATED ORAL 3 TIMES DAILY PRN
Status: DISCONTINUED | OUTPATIENT
Start: 2023-12-09 | End: 2023-12-11 | Stop reason: HOSPADM

## 2023-12-09 RX ORDER — SODIUM CHLORIDE 0.9 % (FLUSH) 0.9 %
10 SYRINGE (ML) INJECTION EVERY 12 HOURS SCHEDULED
Status: DISCONTINUED | OUTPATIENT
Start: 2023-12-09 | End: 2023-12-11 | Stop reason: HOSPADM

## 2023-12-09 RX ORDER — PROCHLORPERAZINE EDISYLATE 5 MG/ML
5 INJECTION INTRAMUSCULAR; INTRAVENOUS EVERY 6 HOURS PRN
Status: DISCONTINUED | OUTPATIENT
Start: 2023-12-09 | End: 2023-12-11 | Stop reason: HOSPADM

## 2023-12-09 RX ADMIN — MIDAZOLAM HYDROCHLORIDE 2 MG: 1 INJECTION, SOLUTION INTRAMUSCULAR; INTRAVENOUS at 10:00

## 2023-12-09 RX ADMIN — PROPOFOL 50 MG: 10 INJECTION, EMULSION INTRAVENOUS at 10:00

## 2023-12-09 RX ADMIN — Medication 10 ML: at 22:45

## 2023-12-09 RX ADMIN — SODIUM CHLORIDE, POTASSIUM CHLORIDE, SODIUM LACTATE AND CALCIUM CHLORIDE: 600; 310; 30; 20 INJECTION, SOLUTION INTRAVENOUS at 09:56

## 2023-12-09 RX ADMIN — IOPAMIDOL 100 ML: 755 INJECTION, SOLUTION INTRAVENOUS at 01:11

## 2023-12-09 RX ADMIN — NICOTINE 1 PATCH: 21 PATCH, EXTENDED RELEASE TRANSDERMAL at 12:06

## 2023-12-09 RX ADMIN — SODIUM CHLORIDE, POTASSIUM CHLORIDE, SODIUM LACTATE AND CALCIUM CHLORIDE 50 ML/HR: 600; 310; 30; 20 INJECTION, SOLUTION INTRAVENOUS at 03:12

## 2023-12-09 RX ADMIN — PROPOFOL 50 MG: 10 INJECTION, EMULSION INTRAVENOUS at 10:02

## 2023-12-09 RX ADMIN — FOLIC ACID 1 MG: 1 TABLET ORAL at 08:14

## 2023-12-09 RX ADMIN — THIAMINE HYDROCHLORIDE 500 MG: 100 INJECTION, SOLUTION INTRAMUSCULAR; INTRAVENOUS at 22:46

## 2023-12-09 RX ADMIN — Medication 10 ML: at 08:15

## 2023-12-09 RX ADMIN — SODIUM CHLORIDE, POTASSIUM CHLORIDE, SODIUM LACTATE AND CALCIUM CHLORIDE 500 ML: 600; 310; 30; 20 INJECTION, SOLUTION INTRAVENOUS at 02:41

## 2023-12-09 RX ADMIN — PROPOFOL 20 MG: 10 INJECTION, EMULSION INTRAVENOUS at 09:59

## 2023-12-09 RX ADMIN — THIAMINE HYDROCHLORIDE 500 MG: 100 INJECTION, SOLUTION INTRAMUSCULAR; INTRAVENOUS at 08:14

## 2023-12-09 RX ADMIN — PANTOPRAZOLE SODIUM 40 MG: 40 INJECTION, POWDER, FOR SOLUTION INTRAVENOUS at 08:14

## 2023-12-09 RX ADMIN — CYANOCOBALAMIN 1000 MCG: 1000 INJECTION, SOLUTION INTRAMUSCULAR at 17:32

## 2023-12-09 RX ADMIN — THIAMINE HYDROCHLORIDE 500 MG: 100 INJECTION, SOLUTION INTRAMUSCULAR; INTRAVENOUS at 14:42

## 2023-12-09 NOTE — CONSULTS
[facility], [place], operated by [facility]  Initial Inpatient Consult Note    Referring Provider: hospitalist    Reason for Consultation: anemia, etoh use    Subjective     History of present illness:    63 y.o. male with reported h/o daily etoh use admitted with reported vomiting and found to have hgb of 4.  He has had 4units of prbc since arrival.  Her remains stable in the ICU with no ongoing bleeding observed.  He is unable to give further history and family has stepped away from the bedside.  Nursing has been in close contact with family who speak english and interpret for him.    Past Medical History:  History reviewed. No pertinent past medical history.  Past Surgical History:  History reviewed. No pertinent surgical history.   Social History:   Social History     Tobacco Use    Smoking status: Every Day     Packs/day: 1.00     Years: 50.00     Additional pack years: 0.00     Total pack years: 50.00     Types: Cigarettes    Smokeless tobacco: Never   Substance Use Topics    Alcohol use: Yes     Alcohol/week: 42.0 standard drinks of alcohol     Types: 42 Cans of beer per week      Family History:  Family History   Problem Relation Age of Onset    Heart attack Brother        Home Meds:  No medications prior to admission.     Current Meds:   folic acid, 1 mg, Oral, Daily  pantoprazole, 40 mg, Intravenous, BID AC  senna-docusate sodium, 2 tablet, Oral, BID  sodium chloride, 10 mL, Intravenous, Q12H  thiamine (B-1) IV, 500 mg, Intravenous, Q8H   Followed by  [START ON 12/12/2023] thiamine (B-1) IV, 200 mg, Intravenous, Q8H   Followed by  [START ON 12/16/2023] thiamine, 100 mg, Oral, Daily      Allergies:  No Known Allergies  Review of Systems  Pertinent items are noted in HPI, all other systems reviewed and negative         Vital Signs  Temp:  [98 °F (36.7 °C)-99.4 °F (37.4 °C)] 98.7 °F (37.1 °C)  Heart Rate:  [] 82  Resp:  [13-22] 17  BP: ()/(57-74) 128/74  Physical Exam:  General Appearance:    Alert,  cooperative, in no acute distress   Head:    Normocephalic, without obvious abnormality, atraumatic   Eyes:          conjunctivae and sclerae normal, no   icterus   Throat:   no thrush, oral mucosa moist   Neck:   Supple, no adenopathy   Lungs:     Clear to auscultation bilaterally    Heart:    Regular rhythm and normal rate    Chest Wall:    No abnormalities observed   Abdomen:     Soft, nondistended, nontender; normal bowel sounds   Extremities:   no edema, no redness   Skin:   No bruising or rash   Psychiatric:  normal mood and insight     Results Review:  [x]  Laboratory   [x]  Radiology  []  Pathology      I reviewed the patient's new clinical results.    Results from last 7 days   Lab Units 12/08/23 2154 12/08/23  2132   WBC 10*3/mm3  --  4.40   HEMOGLOBIN g/dL 4.1* 4.1*   HEMATOCRIT % 11.8* 11.8*   PLATELETS 10*3/mm3  --  71*     Results from last 7 days   Lab Units 12/08/23  2132   SODIUM mmol/L 131*   POTASSIUM mmol/L 4.0   CHLORIDE mmol/L 96*   CO2 mmol/L 23.2   BUN mg/dL 10   CREATININE mg/dL 0.72*   CALCIUM mg/dL 8.7   BILIRUBIN mg/dL 2.2*   ALK PHOS U/L 66   ALT (SGPT) U/L 34   AST (SGOT) U/L 60*   GLUCOSE mg/dL 115*     Results from last 7 days   Lab Units 12/08/23  2132   INR  1.13     Lab Results   Lab Value Date/Time    LIPASE 14 12/08/2023 2132    LIPASE 28 03/02/2023 1706       Radiology:  CT Abdomen Pelvis With Contrast   Final Result       1. No evidence for acute abnormality throughout the abdomen or pelvis.                PILY BROOKE MD          Electronically Signed and Approved By: PILY BROOKE MD on 12/09/2023 at 1:36                           US Abdomen Complete    (Results Pending)        Assessment & Plan       Anemia    Symptomatic anemia       Plan:  Plan for egd this am.  Risk and benefits obtained from family with patient.  Currently on PPI   Will observe for etoh withdrawal      I discussed the patients findings and my recommendations with patient and nursing staff.    Alonzo  Everardo Dow MD

## 2023-12-09 NOTE — ANESTHESIA PREPROCEDURE EVALUATION
Anesthesia Evaluation     Patient summary reviewed and Nursing notes reviewed   no history of anesthetic complications:   NPO Solid Status: > 8 hours  NPO Liquid Status: > 2 hours           Airway   Mallampati: I  TM distance: >3 FB  Neck ROM: full  No difficulty expected  Dental - normal exam     Pulmonary - normal exam    breath sounds clear to auscultation  (+) a smoker Current,  Cardiovascular - negative cardio ROS and normal exam  Exercise tolerance: good (4-7 METS)    Rhythm: regular  Rate: normal        Neuro/Psych- negative ROS  GI/Hepatic/Renal/Endo    (+) GI bleeding active bleeding    Musculoskeletal (-) negative ROS    Abdominal    Substance History   (+) alcohol use (4 beers daily)     OB/GYN negative ob/gyn ROS         Other - negative ROS       ROS/Med Hx Other: Sinhala speaking only -  needed    12/11/23 04:01  WBC: 3.70  RBC: 2.82 (L)  Hemoglobin: 9.6 (L)  Hematocrit: 27.7 (L)  Platelets: 109 (L)  RDW: 25.2 (H)  MCV: 98.2 (H)  MCH: 34.0 (H)  MCHC: 34.7  MPV: 12.0  RDW-SD: 50.4    Received 4 units PRBC's , 1 unit platelets since admission  Pt w/ etoh use in past          Latest Reference Range & Units 12/08/23 21:32   Protime 11.8 - 14.9 Seconds 14.7   INR 0.86 - 1.15  1.13     IMPRESSION:ct scan                 1. No evidence for acute abnormality throughout the abdomen or pelvis.     BORDERLINE ECG -  Sinus rhythm  Borderline ST depression, anterolateral leads       Anesthesia Plan    ASA 3 - emergent     general   total IV anesthesia  (Patient understands anesthesia not responsible for dental damage.    Pt w/ gi bleed, transfused 4 units prbc last night, will draw h and h this am, upper egd to evaluate for upper cause of anemia)  intravenous induction     Anesthetic plan, risks, benefits, and alternatives have been provided, discussed and informed consent has been obtained with: patient and spouse/significant other.  Pre-procedure education provided  Use of blood products discussed with  patient .    Plan discussed with CRNA.      CODE STATUS:    Level Of Support Discussed With: Patient  Code Status (Patient has no pulse and is not breathing): CPR (Attempt to Resuscitate)  Medical Interventions (Patient has pulse or is breathing): Full Support

## 2023-12-09 NOTE — PROGRESS NOTES
[facility]   Hospitalist Progress Note    Date of admission: 12/8/2023  Patient Name: Terry Tena  1960  Date: 12/9/2023      Subjective     Chief Complaint   Patient presents with    Vomiting       Interval Followup: No current abdominal pain, no nausea or vomiting, surgery and asking for diet.  Discussed smoking cessation and alcohol use at length.  Wife at bedside also strongly encouraging of his changing behaviors.  Patient appreciative and interested in stopping.  Once he is medically managed.  Does have some GERD symptoms.      Objective     Vitals:   Temp:  [98 °F (36.7 °C)-99.4 °F (37.4 °C)] 98.4 °F (36.9 °C)  Heart Rate:  [] 72  Resp:  [13-22] 17  BP: ()/(57-86) 125/71    Physical Exam  Awake in bed thin frail malnourished appearing  CTAB no wheezes  Mildly elevated rate regular rhythm no lower extremity pitting edema  Abdomen soft nontender nondistended  AOx3 answering appropriately      Result Review:  Vital signs, labs and recent relevant imaging reviewed.        acetaminophen **OR** acetaminophen    aluminum-magnesium hydroxide-simethicone    senna-docusate sodium **AND** polyethylene glycol **AND** bisacodyl **AND** bisacodyl    hydrOXYzine    influenza vaccine    Lidocaine    melatonin    nitroglycerin    ondansetron    prochlorperazine    sodium chloride    sodium chloride    cyanocobalamin, 1,000 mcg, Intramuscular, Once  folic acid, 1 mg, Oral, Daily  nicotine, 1 patch, Transdermal, Q24H  [START ON 12/10/2023] pantoprazole, 40 mg, Oral, Q AM  senna-docusate sodium, 2 tablet, Oral, BID  sodium chloride, 10 mL, Intravenous, Q12H  thiamine (B-1) IV, 500 mg, Intravenous, Q8H   Followed by  [START ON 12/12/2023] thiamine (B-1) IV, 200 mg, Intravenous, Q8H   Followed by  [START ON 12/16/2023] thiamine, 100 mg, Oral, Daily  [START ON 12/10/2023] vitamin B-12, 1,000 mcg, Oral, Daily        CT Abdomen Pelvis With Contrast    Result Date: 12/9/2023    1. No evidence for acute  abnormality throughout the abdomen or pelvis.     PILY BROOKE MD       Electronically Signed and Approved By: PILY BROOKE MD on 12/09/2023 at 1:36              Assessment / Plan     Summary: 63-year-old male with history of chronic alcohol dependence, pack-a-day smoking and no other prior past medical history who presented with nausea vomiting and chest discomfort over the past few days found to be acutely anemic with a hemoglobin of 4, admitted to the ICU, GI assisted with evaluation patient undergoing EGD 12/9 with findings of LA grade D reflux esophagitis and small hiatal hernia.    Assessment/Plan (clinically significant if listed here)  Symptomatic anemia concern for GI bleed as well as B12 deficiency, hypoproliferation in setting of chronic alcohol abuse/dependence  LA grade a reflux esophagitis  Small hiatal hernia  Elevated liver enzymes in setting of chronic alcohol abuse  Chronic pack-a-day nicotine dependence/smoking    4 units of PRBC, trend hemoglobin initial repeat showed appropriate response  Consulted GI 12/9 EGD obtained findings of LA grade a reflux esophagitis no acute bleeding noted during visualization, small hiatal hernia, continuing on PPI daily and plans for follow-up colonoscopy on Monday  Counseled patient on alcohol cessation and with his wife as well.  Placed on CIWA initially but patient's last drink was at least 2 weeks ago and has been negative on initial scoring do suspect this is accurate, continuing  Follow-up B12 folate, significant macrocytosis with MCV of 137 hemoglobin of 4 and admission, status post 4 units PRBC initially  Given dose of IM B12 and continue on p.o. B12, multivitamin  T. bili and AST elevation in setting of chronic alcohol use, INR 1.1 and   Notably hyperproliferative RPI, suspect bone marrow suppression from his alcohol use, will check a peripheral smear as well  patient passed per day smoker, is interested in quitting, will use nicotine patch while here  and I will send him home with some as well discussed that I would prefer him to quit drinking first and if trying to quit smoking at the same time is going to be prohibitively difficult to prior to his 1 and then more, cessation secondarily.  Since he will be in the hospital for some time he is going to see if he can do both.  Nicotine patch ordered  PT/OT  Check a.m. CBC, BMP, magnesium, phosphorus  Transfer out of the unit later if hemoglobin trend remained stable  Discussed with GI pulmonology RN        DVT prophylaxis:  Mechanical DVT prophylaxis orders are present.    Level Of Support Discussed With: Patient  Code Status (Patient has no pulse and is not breathing): CPR (Attempt to Resuscitate)  Medical Interventions (Patient has pulse or is breathing): Full Support    Patient is critically ill due to severe symptomatic anemia requiring 4 units of PRBCs, alcohol use, severe B12 deficiency, GERD and additional issues as above.  I have spent at least 31 minutes of critical care time reviewing documentation, pertinent labs, imaging studies, examining the patient, modifying care plan, and discussing patient's condition and care plan with the patient, nursing and consulting mds.      CBC          3/2/2023    17:06 12/8/2023    21:32 12/8/2023    21:54 12/9/2023    11:42   CBC   WBC 6.94  4.40   4.19    RBC 2.17  0.86   2.79    Hemoglobin 11.2  4.1  4.1  9.6    Hematocrit 28.0  11.8  11.8  27.0    .0  137.2   96.8    MCH 51.6  47.7   34.4    MCHC 40.0  34.7   35.6    RDW 13.5  24.7      Platelets 124  71   72        CMP          3/2/2023    17:06 12/8/2023    21:32 12/9/2023    11:42   CMP   Glucose 77  115  99    BUN 6  10  10    Creatinine 0.55  0.72  0.64    EGFR 111.4  102.0  105.7    Sodium 132  131  134    Potassium 3.9  4.0  4.3    Chloride 93  96  102    Calcium 8.5  8.7  8.3    Total Protein 7.0  6.7     Albumin 4.7  4.4     Globulin 2.3  2.3     Total Bilirubin 0.9  2.2     Alkaline Phosphatase 50  66      AST (SGOT) 49  60     ALT (SGPT) 30  34     Albumin/Globulin Ratio 2.0  1.9     BUN/Creatinine Ratio 10.9  13.9  15.6    Anion Gap 20.1  11.8  11.7

## 2023-12-09 NOTE — H&P (VIEW-ONLY)
[facility]  Initial Inpatient Consult Note    Referring Provider: hospitalist    Reason for Consultation: anemia, etoh use    Subjective     History of present illness:    63 y.o. male with reported h/o daily etoh use admitted with reported vomiting and found to have hgb of 4.  He has had 4units of prbc since arrival.  Her remains stable in the ICU with no ongoing bleeding observed.  He is unable to give further history and family has stepped away from the bedside.  Nursing has been in close contact with family who speak english and interpret for him.    Past Medical History:  History reviewed. No pertinent past medical history.  Past Surgical History:  History reviewed. No pertinent surgical history.   Social History:   Social History     Tobacco Use    Smoking status: Every Day     Packs/day: 1.00     Years: 50.00     Additional pack years: 0.00     Total pack years: 50.00     Types: Cigarettes    Smokeless tobacco: Never   Substance Use Topics    Alcohol use: Yes     Alcohol/week: 42.0 standard drinks of alcohol     Types: 42 Cans of beer per week      Family History:  Family History   Problem Relation Age of Onset    Heart attack Brother        Home Meds:  No medications prior to admission.     Current Meds:   folic acid, 1 mg, Oral, Daily  pantoprazole, 40 mg, Intravenous, BID AC  senna-docusate sodium, 2 tablet, Oral, BID  sodium chloride, 10 mL, Intravenous, Q12H  thiamine (B-1) IV, 500 mg, Intravenous, Q8H   Followed by  [START ON 12/12/2023] thiamine (B-1) IV, 200 mg, Intravenous, Q8H   Followed by  [START ON 12/16/2023] thiamine, 100 mg, Oral, Daily      Allergies:  No Known Allergies  Review of Systems  Pertinent items are noted in HPI, all other systems reviewed and negative         Vital Signs  Temp:  [98 °F (36.7 °C)-99.4 °F (37.4 °C)] 98.7 °F (37.1 °C)  Heart Rate:  [] 82  Resp:  [13-22] 17  BP: ()/(57-74) 128/74  Physical Exam:  General Appearance:    Alert,  cooperative, in no acute distress   Head:    Normocephalic, without obvious abnormality, atraumatic   Eyes:          conjunctivae and sclerae normal, no   icterus   Throat:   no thrush, oral mucosa moist   Neck:   Supple, no adenopathy   Lungs:     Clear to auscultation bilaterally    Heart:    Regular rhythm and normal rate    Chest Wall:    No abnormalities observed   Abdomen:     Soft, nondistended, nontender; normal bowel sounds   Extremities:   no edema, no redness   Skin:   No bruising or rash   Psychiatric:  normal mood and insight     Results Review:  [x]  Laboratory   [x]  Radiology  []  Pathology      I reviewed the patient's new clinical results.    Results from last 7 days   Lab Units 12/08/23 2154 12/08/23  2132   WBC 10*3/mm3  --  4.40   HEMOGLOBIN g/dL 4.1* 4.1*   HEMATOCRIT % 11.8* 11.8*   PLATELETS 10*3/mm3  --  71*     Results from last 7 days   Lab Units 12/08/23  2132   SODIUM mmol/L 131*   POTASSIUM mmol/L 4.0   CHLORIDE mmol/L 96*   CO2 mmol/L 23.2   BUN mg/dL 10   CREATININE mg/dL 0.72*   CALCIUM mg/dL 8.7   BILIRUBIN mg/dL 2.2*   ALK PHOS U/L 66   ALT (SGPT) U/L 34   AST (SGOT) U/L 60*   GLUCOSE mg/dL 115*     Results from last 7 days   Lab Units 12/08/23  2132   INR  1.13     Lab Results   Lab Value Date/Time    LIPASE 14 12/08/2023 2132    LIPASE 28 03/02/2023 1706       Radiology:  CT Abdomen Pelvis With Contrast   Final Result       1. No evidence for acute abnormality throughout the abdomen or pelvis.                PILY BROOKE MD          Electronically Signed and Approved By: PILY BROOKE MD on 12/09/2023 at 1:36                           US Abdomen Complete    (Results Pending)        Assessment & Plan       Anemia    Symptomatic anemia       Plan:  Plan for egd this am.  Risk and benefits obtained from family with patient.  Currently on PPI   Will observe for etoh withdrawal      I discussed the patients findings and my recommendations with patient and nursing staff.    Alonzo  Everardo Dow MD

## 2023-12-09 NOTE — ANESTHESIA POSTPROCEDURE EVALUATION
Patient: Terry Tena    Procedure Summary       Date: 12/09/23 Room / Location: [facility] ENDO VIRTUAL  / [facility] ENDOSCOPY    Anesthesia Start: 0956 Anesthesia Stop: 1015    Procedure: ESOPHAGOGASTRODUODENOSCOPY WITH BIOPSIES Diagnosis:       Symptomatic anemia      (Symptomatic anemia [D64.9])    Surgeons: Alonzo Dow MD Provider: Edy Vazquez MD    Anesthesia Type: general ASA Status: 3 - Emergent            Anesthesia Type: general    Vitals  Vitals Value Taken Time   /77 12/09/23 1020   Temp 36.9 °C (98.4 °F) 12/09/23 1020   Pulse 80 12/09/23 1020   Resp     SpO2 96 % 12/09/23 1020           Post Anesthesia Care and Evaluation    Patient location during evaluation: ICU  Patient participation: complete - patient participated  Level of consciousness: sleepy but conscious  Pain score: 0  Pain management: adequate    Airway patency: patent  Anesthetic complications: No anesthetic complications  PONV Status: none  Cardiovascular status: acceptable  Respiratory status: acceptable  Hydration status: acceptable    Comments: An Anesthesiologist personally participated in the most demanding procedures (including induction and emergence if applicable) in the anesthesia plan, monitored the course of anesthesia administration at frequent intervals and remained physically present and available for immediate diagnosis and treatment of emergencies.        No anesthesia care post op

## 2023-12-09 NOTE — CONSULTS
Pulmonary / Critical Care Consult Note      Patient Name: Terry Tena  : 1960  MRN: [id number]  Primary Care Physician:  Provider, No Known  Referring Physician: Papi Coffey MD  Date of admission: 2023    Subjective   Subjective     Reason for Consult/ Chief Complaint:   Anemia    HPI:  Terry Tena is a 63 y.o. male with history of alcoholism came in with some chest pain.  Reportedly had having some vomiting for the last few days.  Hemoglobin was found to be 4.  Was given 4 units of packed red blood cells which she is actively getting.  Denies any melena or hematochezia.  Does report having some weakness and fatigue.  He is Greek-speaking and has family with him as an .  Patient was admitted to the ICU overnight and currently receiving blood products.  Currently not requiring pressors.  Being evaluated by GI currently for endoscopy.  Unclear of how much he drinks and how often he drinks.    Review of Systems  Constitutional symptoms:  Denied complaints   Ear, nose, throat: Denied complaints  Cardiovascular: Chest pain, otherwise denied complaints  Respiratory: Denied complaints  Gastrointestinal: Vomiting, otherwise denied complaints  Musculoskeletal: Denied complaints  Genitourinary: Denied complaints  Allergy / Immunology: Denied complaints  Hematologic: Denied complaints  Neurologic: Denied complaints  Skin: Denied complaints  Endocrine: Denied complaints  Psychiatric: Denied complaints      Personal History     History reviewed. No pertinent past medical history.    History reviewed. No pertinent surgical history.    Family History: family history includes Heart attack in his brother. Otherwise pertinent FHx was reviewed and not pertinent to current issue.    Social History:  reports that he has been smoking cigarettes. He has a 50.00 pack-year smoking history. He has never used smokeless tobacco. He reports current alcohol use of about 42.0 standard drinks of alcohol per  week. He reports that he does not currently use drugs.    Home Medications:       Allergies:  No Known Allergies    Objective    Objective     Vitals:   Temp:  [98 °F (36.7 °C)-99.4 °F (37.4 °C)] 98.4 °F (36.9 °C)  Heart Rate:  [] 80  Resp:  [13-22] 17  BP: ()/(57-86) 123/77    Physical Exam:  Vital Signs Reviewed   General:  WDWN, Alert, NAD.    HEENT:  PERRL, EOMI.  OP, nares clear  Neck:  Supple, no JVD, no thyromegaly  Chest:  good aeration, clear to auscultation bilaterally, tympanic to percussion bilaterally, no work of breathing noted  CV: RRR, no MGR, pulses 2+, equal.  Abd:  Soft, NT, ND, + BS, no HSM  EXT:  no clubbing, no cyanosis, no edema  Neuro:  A&Ox3, CN grossly intact, no focal deficits.  Skin: No rashes or lesions noted      Result Review    Result Review:  I have personally reviewed the results from the time of this admission to 12/9/2023 10:53 EST and agree with these findings:  [x]  Laboratory  []  Microbiology  [x]  Radiology  [x]  EKG/Telemetry   []  Cardiology/Vascular   []  Pathology  []  Old records  []  Other:  Most notable findings include:       Lab 12/08/23 2154 12/08/23  2132   WBC  --  4.40   HEMOGLOBIN 4.1* 4.1*   HEMATOCRIT 11.8* 11.8*   PLATELETS  --  71*   SODIUM  --  131*   POTASSIUM  --  4.0   CHLORIDE  --  96*   CO2  --  23.2   BUN  --  10   CREATININE  --  0.72*   GLUCOSE  --  115*   CALCIUM  --  8.7   TOTAL PROTEIN  --  6.7   ALBUMIN  --  4.4   GLOBULIN  --  2.3     CT Abdomen Pelvis With Contrast    Result Date: 12/9/2023  PROCEDURE: CT ABDOMEN PELVIS W CONTRAST  COMPARISON: None  INDICATIONS: Abdominal pain  TECHNIQUE: After obtaining the patient's consent, CT images were created with non-ionic intravenous contrast material.   PROTOCOL:   Standard imaging protocol performed    RADIATION:   DLP: 282.6 mGy*cm   Automated exposure control was utilized to minimize radiation dose. CONTRAST: 80 cc Isovue 370 I.V.  FINDINGS:  No consolidations or pleural effusions are  seen involving the lung bases.  The liver, spleen, and pancreas are unremarkable. The gallbladder and biliary tree are unremarkable. The bilateral adrenal glands are symmetric and unremarkable. The bilateral kidneys are symmetric and unremarkable.  No significant bowel dilatation or obstruction is seen. A normal-appearing appendix is observed. There is no evidence for acute appendicitis. No abnormal fluid collections are seen. No significant free fluid is observed. No significant lymphadenopathy is seen throughout the abdomen or pelvis. The bladder is decompressed. The celiac and superior mesenteric arterial distributions are well opacified without evidence for occlusion.  No acute osseous abnormalities are seen.      Impression:   1. No evidence for acute abnormality throughout the abdomen or pelvis.     PILY BROOKE MD       Electronically Signed and Approved By: PILY BROOKE MD on 12/09/2023 at 1:36                  Assessment & Plan   Assessment / Plan     Active Hospital Problems:  Active Hospital Problems    Diagnosis     **Anemia     Symptomatic anemia      Impression:  Acute symptomatic anemia.  Question blood loss versus chronic due to alcoholism.  Macrocytic  Question upper versus lower GI bleed  Alcoholism  Nausea/vomiting  Mildly elevated bilirubin    Plan:  Agree with being in the ICU given hemoglobin of 4 and patient having symptomatic chest pain  Transfuse for units packed red blood cells now  Discontinue IV fluids as this will only dilute the patient's hemoglobin further  Coags completely unremarkable  Will follow-up on reticulocyte count, iron studies, B12 and folic acid  At this point we will have to rule out GI bleeding.  Appreciate GI evaluation.  Will need EGD today and colonoscopy Monday if EGD is negative  Start PPI IV twice daily  Start CIWA with as needed benzodiazepines  Start multivitamin, thiamine, folic acid  N.p.o. for now.  Ensure adequate IV access  Needs CBC, CMP, mag and Phos  after blood transfusion completes  Trend renal panel and electrolytes  Trend liver enzymes    DVT prophylaxis:  Mechanical DVT prophylaxis orders are present.     Code Status and Medical Interventions:   Ordered at: 12/09/23 0032     Level Of Support Discussed With:    Patient     Code Status (Patient has no pulse and is not breathing):    CPR (Attempt to Resuscitate)     Medical Interventions (Patient has pulse or is breathing):    Full Support        The patient is critically ill in the ICU with acute anemia that is symptomatic requiring large-volume blood transfusion, alcoholism. Multidisciplinary bedside critical care rounds were performed with nursing staff, respiratory therapy, pharmacy, nutritional services, social work. I have personally reviewed the chart, labs and any pertinent imaging available.  I have spent 32 minutes of critical care time, excluding procedures, in the care of this patient.    Electronically signed by Ketan Renteria MD, 12/09/23, 10:56 AM EST.

## 2023-12-09 NOTE — ANESTHESIA PREPROCEDURE EVALUATION
Anesthesia Evaluation     Patient summary reviewed and Nursing notes reviewed   no history of anesthetic complications:   NPO Solid Status: > 8 hours  NPO Liquid Status: > 2 hours           Airway   Mallampati: I  TM distance: >3 FB  Neck ROM: full  No difficulty expected  Dental      Pulmonary - negative pulmonary ROS and normal exam    breath sounds clear to auscultation  Cardiovascular - negative cardio ROS and normal exam  Exercise tolerance: good (4-7 METS)    Rhythm: regular  Rate: normal        Neuro/Psych- negative ROS  GI/Hepatic/Renal/Endo - negative ROS     Musculoskeletal (-) negative ROS    Abdominal    Substance History - negative use     OB/GYN negative ob/gyn ROS         Other - negative ROS       ROS/Med Hx Other: Hx of drinking 3 to 4 large beers day    Nausea last 6 days, vomiting up blood    Hgb 4 receiving 4th unit of transfusion currently              Anesthesia Plan    ASA 3 - emergent     general     (Patient understands anesthesia not responsible for dental damage.)  intravenous induction     Anesthetic plan, risks, benefits, and alternatives have been provided, discussed and informed consent has been obtained with: patient.    Use of blood products discussed with patient .    Plan discussed with CRNA.    CODE STATUS:    Level Of Support Discussed With: Patient  Code Status (Patient has no pulse and is not breathing): CPR (Attempt to Resuscitate)  Medical Interventions (Patient has pulse or is breathing): Full Support

## 2023-12-09 NOTE — ED NOTES
Interpretor Yasmine [phone]    For blood consent and screening questions and answer questions patient may have, medication administration

## 2023-12-09 NOTE — H&P
[facility] HISTORY AND PHYSICAL  Date: 2023   Patient Name: Terry Tena  : 1960  MRN: [id number]  Primary Care Physician:  Provider, No Known  Date of admission: 2023    Subjective   Subjective     Chief Complaint: Vomiting    HPI:    Terry Tena is a 63 y.o. male brought to the emergency department for evaluation of vomiting.  Patient is accompanied by daughter-in-law, who provides Turkmen translation.  Patient declined video translation services at our facility.  Reportedly, patient has had nausea vomiting for the last 6 days.  Unable to tolerate anything by mouth, vomits up even water.  Patient reports 4 large beers per day, last drink 15 days ago.  Patient reports left-sided chest pain and left arm intermittently, denies any provoking symptoms.  Denies any recent bleeding or dark or bloody stools.  Patient denies taking any antiplatelet or anticoagulants.  Denies any recent sick contacts, recent travel history.      Personal History     Past Medical History:  History reviewed. No pertinent past medical history.      Past Surgical History:  History reviewed. No pertinent surgical history.      Family History:   Family History   Problem Relation Age of Onset    Heart attack Brother          Social History:   Social History     Socioeconomic History    Marital status: Single   Tobacco Use    Smoking status: Every Day     Packs/day: 1.00     Years: 50.00     Additional pack years: 0.00     Total pack years: 50.00     Types: Cigarettes    Smokeless tobacco: Never   Vaping Use    Vaping Use: Never used   Substance and Sexual Activity    Alcohol use: Yes     Alcohol/week: 42.0 standard drinks of alcohol     Types: 42 Cans of beer per week    Drug use: Not Currently         Home Medications: Currently being compiled by nursing staff, will be reviewed when available.    Allergies:  No Known Allergies    Review of Systems   All systems were reviewed and negative except for:  Nausea, vomiting, abdominal pain, chest pain, arm pain    Objective   Objective     Vitals:   Temp:  [98.4 °F (36.9 °C)-99.4 °F (37.4 °C)] 99.4 °F (37.4 °C)  Heart Rate:  [] 97  Resp:  [18] 18  BP: ()/(57-63) 105/62    Physical Exam    Constitutional: Awake, alert, no acute distress   Eyes: Pupils equal, sclerae anicteric, no conjunctival injection   HENT: NCAT, mucous membranes moist   Neck: Supple, no thyromegaly, no lymphadenopathy, trachea midline   Respiratory: Clear to auscultation bilaterally, nonlabored respirations    Cardiovascular: RRR, no murmurs, rubs, or gallops, palpable pedal pulses bilaterally   Gastrointestinal: Positive bowel sounds, soft, nontender, nondistended, no hepatosplenomegaly appreciated, no masses.   Musculoskeletal: No bilateral ankle edema, no clubbing or cyanosis to extremities   Psychiatric: Appropriate affect, cooperative   Neurologic: Oriented x 3, strength symmetric in all extremities, Cranial Nerves grossly intact to confrontation, speech clear   Skin: No rashes     Result Review    Result Review:  I have personally reviewed the results from the time of this admission to 12/8/2023 23:53 EST and agree with these findings:  [x]  Laboratory  [x]  Microbiology  [x]  Radiology  [x]  EKG/Telemetry   []  Cardiology/Vascular   []  Pathology  []  Old records  []  Other:      Assessment & Plan   Assessment / Plan     Assessment/Plan:   Severe anemia-transfused 4 units PRBCs.  Anemia panel.  Hemoccult pending.  If Hemoccult positive, consider GI consult and endoscopy.  Otherwise, patient would benefit from screening colonoscopy in the outpatient setting.  Intractable nausea with vomiting-antiemetics.  IV fluids.  Chest pain-likely due to severe anemia.  Telemetry.  Check troponin.  Due to ongoing bleeding, heparinization be contraindicated.  Nitro and morphine as needed.  Elevated bilirubin-check hepatitis panel.  CT scan of the abdomen pelvis with contrast to rule out  intra-abdominal mass.      DVT prophylaxis: SCDs    CODE STATUS: Full code    Admission Status:  I believe this patient meets observation status.    Electronically signed by Michael Smith DO, 12/08/23, 11:53 PM EST.

## 2023-12-09 NOTE — ED PROVIDER NOTES
Time: 10:27 PM EST  Date of encounter:  12/8/2023  Independent Historian/Clinical History and Information was obtained by:   Patient and Family    History is limited by: Language Barrier    Chief Complaint: Vomiting      History of Present Illness:  Patient is a 63 y.o. year old male who presents to the emergency department for evaluation of vomiting    Patient is accompanied by his daughter-in-law who provides Italian translation service.  Patient declines our video translation services.    They state that he has had nausea and vomiting for the past 6 days and has been unable to tolerate anything by mouth.  He has had diarrhea during that time.  He denies any abdominal pain.  He and family became more concerned as he developed left-sided chest pain and left arm pain intermittently throughout the past couple days.  He denies any blood in his vomit or bowel movements.  He denies any melanotic stools.  He denies any antiplatelet or anticoagulant agents.  HPI    Patient Care Team  Primary Care Provider: Provider, No Known    Past Medical History:     No Known Allergies  History reviewed. No pertinent past medical history.  History reviewed. No pertinent surgical history.  Family History   Problem Relation Age of Onset    Heart attack Brother        Home Medications:  Prior to Admission medications    Not on File        Social History:   Social History     Tobacco Use    Smoking status: Every Day     Packs/day: 1.00     Years: 50.00     Additional pack years: 0.00     Total pack years: 50.00     Types: Cigarettes    Smokeless tobacco: Never   Vaping Use    Vaping Use: Never used   Substance Use Topics    Alcohol use: Yes     Alcohol/week: 42.0 standard drinks of alcohol     Types: 42 Cans of beer per week    Drug use: Not Currently         Review of Systems:  Review of Systems   Constitutional:  Positive for fatigue. Negative for chills and fever.   HENT:  Negative for congestion, ear pain and sore throat.    Eyes:   "Negative for pain.   Respiratory:  Negative for cough, chest tightness and shortness of breath.    Cardiovascular:  Negative for chest pain.   Gastrointestinal:  Positive for diarrhea, nausea and vomiting. Negative for abdominal pain.   Genitourinary:  Negative for flank pain and hematuria.   Musculoskeletal:  Negative for joint swelling.   Skin:  Negative for pallor.   Neurological:  Positive for weakness. Negative for seizures and headaches.   All other systems reviewed and are negative.       Physical Exam:  /65   Pulse 80   Temp 98 °F (36.7 °C) (Oral)   Resp 17   Ht 165.1 cm (65\")   Wt 53.3 kg (117 lb 8.1 oz)   SpO2 100%   BMI 19.55 kg/m²     Physical Exam  Vitals and nursing note reviewed.   Constitutional:       General: He is not in acute distress.     Appearance: Normal appearance. He is not toxic-appearing.   HENT:      Head: Normocephalic and atraumatic.      Jaw: There is normal jaw occlusion.   Eyes:      General: Lids are normal.      Extraocular Movements: Extraocular movements intact.      Pupils: Pupils are equal, round, and reactive to light.      Comments: Conjunctival pallor   Cardiovascular:      Rate and Rhythm: Normal rate and regular rhythm.      Pulses: Normal pulses.      Heart sounds: Normal heart sounds.   Pulmonary:      Effort: Pulmonary effort is normal. No respiratory distress.      Breath sounds: Normal breath sounds. No wheezing or rhonchi.   Abdominal:      General: Abdomen is flat.      Palpations: Abdomen is soft.      Tenderness: There is no abdominal tenderness. There is no guarding or rebound.   Musculoskeletal:         General: Normal range of motion.      Cervical back: Normal range of motion and neck supple.      Right lower leg: No edema.      Left lower leg: No edema.   Skin:     General: Skin is warm and dry.      Coloration: Skin is pale.   Neurological:      Mental Status: He is alert and oriented to person, place, and time. Mental status is at baseline. "   Psychiatric:         Mood and Affect: Mood normal.               Procedures:  Procedures      Medical Decision Making:      Comorbidities that affect care:    None    External Notes reviewed:    None      The following orders were placed and all results were independently analyzed by me:  Orders Placed This Encounter   Procedures    Rapid Strep A Screen - Swab, Throat    COVID PRE-OP / PRE-PROCEDURE SCREENING ORDER (NO ISOLATION) - Swab, Nasopharynx    COVID-19, FLU A/B, RSV PCR 1 HR TAT - Swab, Nasopharynx    Beta Strep Culture, Throat - Swab, Throat    CT Abdomen Pelvis With Contrast    [place] Draw    Comprehensive Metabolic Panel    Lipase    Urinalysis With Microscopic If Indicated (No Culture) - Urine, Clean Catch    Lactic Acid, Plasma    CBC Auto Differential    Scan Slide    Hemoglobin & Hematocrit, Blood    Urinalysis, Microscopic Only - Urine, Clean Catch    Occult Blood, Fecal By Immunoassay - Stool, Per Rectum    Protime-INR    aPTT    Basic Metabolic Panel    Magnesium    Folate    Vitamin B12    Reticulocytes    Iron Profile    Ferritin    Hepatitis Panel, Acute    High Sensitivity Troponin T    Phosphorus    Hemoglobin & Hematocrit, Blood    CBC Auto Differential    NPO Diet NPO Type: Strict NPO    Undress & Gown    Verify Informed Consent    Vital Signs Every Hour and Per Hospital Policy Based on Patient Condition    Telemetry - Place Orders & Notify Provider of Results When Patient Experiences Acute Chest Pain, Dysrhythmia or Respiratory Distress    Height & Weight    Daily Weights    Intake & Output    Oral Care - Patient Not on NPPV & Not Intubated    Target Arousal Level RASS 0 to -1    Use Mobility Guidelines for Advancement of Activity    Saline Lock & Maintain IV Access    Place Sequential Compression Device    Maintain Sequential Compression Device    Vital Signs    Continuous Pulse Oximetry    Obtain Baseline Clinical [place] Withdrawal Assessment - Ar (CIWA-Ar), Sedation Scale & Vital  Signs    Clinical [place] Withdrawal Assessment (CIWA-Ar)    If CIWA-Ar Score Less Than 8 For 3 Consecutive Assessments, Monitor Every 4 Hours & Discontinue Assessment When CIWA-Ar Less Than 8 for 24 Hours    Obtain Pre & Post Sedation Scores With Every Lorazepam Dose - Hold For POSS Greater Than 2 or RASS of -3 or Less    Notify Provider - Withdrawal    Notify Provider of Abnormal Lab Results    Notify Provider - Vitals    Code Status and Medical Interventions:    Hospitalist (on-call MD unless specified)    Inpatient Nutrition Consult    Inpatient Pulmonology Consult    Inpatient Gastroenterology Consult    ECG 12 Lead Chest Pain    ECG 12 Lead Chest Pain    Adult Transthoracic Echo Complete W/ Cont if Necessary Per Protocol    Type & Screen    ABO RH Specimen Verification    Prepare RBC, 4 Units    Insert Peripheral IV    Insert Peripheral IV    Initiate Observation Status    Seizure Precautions    Safety Precautions    CBC & Differential    Green Top (Gel)    Lavender Top    Gold Top - SST    Light Blue Top    CBC & Differential       Medications Given in the Emergency Department:  Medications   nitroglycerin (NITROSTAT) SL tablet 0.4 mg (has no administration in time range)   sodium chloride 0.9 % flush 10 mL (10 mL Intravenous Not Given 12/9/23 0244)   sodium chloride 0.9 % flush 10 mL (has no administration in time range)   sodium chloride 0.9 % infusion 40 mL (has no administration in time range)   sennosides-docusate (PERICOLACE) 8.6-50 MG per tablet 2 tablet (has no administration in time range)     And   polyethylene glycol (MIRALAX) packet 17 g (has no administration in time range)     And   bisacodyl (DULCOLAX) EC tablet 5 mg (has no administration in time range)     And   bisacodyl (DULCOLAX) suppository 10 mg (has no administration in time range)   acetaminophen (TYLENOL) tablet 650 mg (has no administration in time range)     Or   acetaminophen (TYLENOL) suppository 650 mg (has no administration in  time range)   ondansetron (ZOFRAN) injection 4 mg (has no administration in time range)   influenza vac split quad (FLUZONE,FLUARIX,AFLURIA,FLULAVAL) injection 0.5 mL (has no administration in time range)   pantoprazole (PROTONIX) injection 40 mg (has no administration in time range)   thiamine (B-1) 500 mg in sodium chloride 0.9 % 100 mL IVPB (has no administration in time range)     Followed by   thiamine (B-1) injection 200 mg (has no administration in time range)     Followed by   thiamine (VITAMIN B-1) tablet 100 mg (has no administration in time range)   folic acid (FOLVITE) tablet 1 mg (has no administration in time range)   pantoprazole (PROTONIX) injection 80 mg (80 mg Intravenous Given 12/8/23 2322)   lactated ringers bolus 500 mL (500 mL Intravenous New Bag 12/9/23 0241)   iopamidol (ISOVUE-370) 76 % injection 100 mL (100 mL Intravenous Given 12/9/23 0111)        ED Course:    ED Course as of 12/09/23 0703   Fri Dec 08, 2023   2356 I discussed patient with the hospitalist who agrees to admission.  Given the patient's significant anemia and abnormal LFTs request CT abdomen pelvis. [JS]      ED Course User Index  [JS] Get Allison MD       Labs:    Lab Results (last 24 hours)       Procedure Component Value Units Date/Time    Rapid Strep A Screen - Swab, Throat [[id number]]  (Normal) Collected: 12/08/23 2127    Specimen: Swab from Throat Updated: 12/08/23 2150     Strep A Ag Negative    COVID PRE-OP / PRE-PROCEDURE SCREENING ORDER (NO ISOLATION) - Swab, Nasopharynx [[id number]]  (Normal) Collected: 12/08/23 2127    Specimen: Swab from Nasopharynx Updated: 12/08/23 2208    Narrative:      The following orders were created for panel order COVID PRE-OP / PRE-PROCEDURE SCREENING ORDER (NO ISOLATION) - Swab, Nasopharynx.  Procedure                               Abnormality         Status                     ---------                               -----------         ------                     COVID-19, FLU A/B,  RSV P...[[id number]]  Normal              Final result                 Please view results for these tests on the individual orders.    COVID-19, FLU A/B, RSV PCR 1 HR TAT - Swab, Nasopharynx [[id number]]  (Normal) Collected: 12/08/23 2127    Specimen: Swab from Nasopharynx Updated: 12/08/23 2208     COVID19 Not Detected     Influenza A PCR Not Detected     Influenza B PCR Not Detected     RSV, PCR Not Detected    Narrative:      Fact sheet for providers: https://www.fda.gov/media/397885/download    Fact sheet for patients: https://www.fda.gov/media/337068/download    Test performed by PCR.    Beta Strep Culture, Throat - Swab, Throat [[id number]] Collected: 12/08/23 2127    Specimen: Swab from Throat Updated: 12/08/23 2150    CBC & Differential [[id number]]  (Abnormal) Collected: 12/08/23 2132    Specimen: Blood from Arm, Right Updated: 12/08/23 2228    Narrative:      The following orders were created for panel order CBC & Differential.  Procedure                               Abnormality         Status                     ---------                               -----------         ------                     CBC Auto Differential[[id number]]        Abnormal            Final result               Scan Slide[[id number]]                                       Final result                 Please view results for these tests on the individual orders.    Comprehensive Metabolic Panel [[id number]]  (Abnormal) Collected: 12/08/23 2132    Specimen: Blood from Arm, Right Updated: 12/08/23 2210     Glucose 115 mg/dL      BUN 10 mg/dL      Creatinine 0.72 mg/dL      Sodium 131 mmol/L      Potassium 4.0 mmol/L      Chloride 96 mmol/L      CO2 23.2 mmol/L      Calcium 8.7 mg/dL      Total Protein 6.7 g/dL      Albumin 4.4 g/dL      ALT (SGPT) 34 U/L      AST (SGOT) 60 U/L      Alkaline Phosphatase 66 U/L      Total Bilirubin 2.2 mg/dL      Globulin 2.3 gm/dL      A/G Ratio 1.9 g/dL      BUN/Creatinine Ratio 13.9     Anion Gap 11.8  mmol/L      eGFR 102.0 mL/min/1.73     Narrative:      GFR Normal >60  Chronic Kidney Disease <60  Kidney Failure <15      Lipase [[id number]]  (Normal) Collected: 12/08/23 2132    Specimen: Blood from Arm, Right Updated: 12/08/23 2210     Lipase 14 U/L     Lactic Acid, Plasma [[id number]]  (Normal) Collected: 12/08/23 2132    Specimen: Blood from Arm, Right Updated: 12/08/23 2208     Lactate 1.1 mmol/L     CBC Auto Differential [[id number]]  (Abnormal) Collected: 12/08/23 2132    Specimen: Blood from Arm, Right Updated: 12/08/23 2228     WBC 4.40 10*3/mm3      RBC 0.86 10*6/mm3      Hemoglobin 4.1 g/dL      Hematocrit 11.8 %      .2 fL      MCH 47.7 pg      MCHC 34.7 g/dL      RDW 24.7 %      RDW-.4 fl      MPV --     Comment: Unable to report due to sample interference        Platelets 71 10*3/mm3      Neutrophil % 52.5 %      Lymphocyte % 38.0 %      Monocyte % 5.0 %      Eosinophil % 2.0 %      Basophil % 0.0 %      Immature Grans % 2.5 %      Neutrophils, Absolute 2.31 10*3/mm3      Lymphocytes, Absolute 1.67 10*3/mm3      Monocytes, Absolute 0.22 10*3/mm3      Eosinophils, Absolute 0.09 10*3/mm3      Basophils, Absolute 0.00 10*3/mm3      Immature Grans, Absolute 0.11 10*3/mm3      nRBC 1.8 /100 WBC     Narrative:      Appended report. These results have been appended to a previously verified report.    Scan Slide [[id number]] Collected: 12/08/23 2132    Specimen: Blood from Arm, Right Updated: 12/08/23 2228     Anisocytosis Mod/2+     Macrocytes Mod/2+     Ovalocytes Mod/2+     Poikilocytes Mod/2+     Schistocytes Slight/1+     WBC Morphology Normal     Platelet Estimate Decreased    Protime-INR [[id number]]  (Normal) Collected: 12/08/23 2132    Specimen: Blood from Arm, Right Updated: 12/08/23 2241     Protime 14.7 Seconds      INR 1.13    Narrative:      Suggested Therapeutic Ranges For Oral Anticoagulant Therapy:  Level of Therapy                      INR Target Range  Standard Dose                             2.0-3.0  High Dose                                2.5-3.5  Patients not receiving anticoagulant  Therapy Normal Range                     0.86-1.15    aPTT [[id number]]  (Abnormal) Collected: 12/08/23 2132    Specimen: Blood from Arm, Right Updated: 12/08/23 2241     PTT 33.4 seconds     Folate [[id number]] Collected: 12/08/23 2132    Specimen: Blood from Arm, Right Updated: 12/09/23 0150    Vitamin B12 [[id number]] Collected: 12/08/23 2132    Specimen: Blood from Arm, Right Updated: 12/09/23 0151    Iron Profile [[id number]]  (Abnormal) Collected: 12/08/23 2132    Specimen: Blood from Arm, Right Updated: 12/09/23 0215     Iron 193 mcg/dL      Iron Saturation (TSAT) 64 %      Transferrin 203 mg/dL      TIBC 302 mcg/dL     Ferritin [[id number]]  (Normal) Collected: 12/08/23 2132    Specimen: Blood from Arm, Right Updated: 12/09/23 0220     Ferritin 293.20 ng/mL     Narrative:      <12 ng/mL usually associated with Iron Deficiency Anemia. Above normal range levels may be due to Hepatic and/or Chronic Inflammatory Disease.  Results may be falsely decreased if patient taking Biotin.      High Sensitivity Troponin T [[id number]]  (Normal) Collected: 12/08/23 2132    Specimen: Blood from Arm, Right Updated: 12/09/23 0220     HS Troponin T 8 ng/L     Narrative:      High Sensitive Troponin T Reference Range:  <14.0 ng/L- Negative Female for AMI  <22.0 ng/L- Negative Male for AMI  >=14 - Abnormal Female indicating possible myocardial injury.  >=22 - Abnormal Male indicating possible myocardial injury.   Clinicians would have to utilize clinical acumen, EKG, Troponin, and serial changes to determine if it is an Acute Myocardial Infarction or myocardial injury due to an underlying chronic condition.         Urinalysis With Microscopic If Indicated (No Culture) - Urine, Clean Catch [[id number]]  (Abnormal) Collected: 12/08/23 2141    Specimen: Urine, Clean Catch Updated: 12/08/23 2207     Color, UA Dark Yellow      Appearance, UA Clear     pH, UA 6.5     Specific Gravity, UA 1.017     Glucose, UA Negative     Ketones, UA Trace     Bilirubin, UA Small (1+)     Blood, UA Negative     Protein, UA Negative     Leuk Esterase, UA Trace     Nitrite, UA Negative     Urobilinogen, UA >=8.0 E.U./dL    Urinalysis, Microscopic Only - Urine, Clean Catch [[id number]]  (Abnormal) Collected: 12/08/23 2141    Specimen: Urine, Clean Catch Updated: 12/08/23 2207     RBC, UA 3-5 /HPF      WBC, UA 0-2 /HPF      Bacteria, UA None Seen /HPF      Squamous Epithelial Cells, UA 0-2 /HPF      Hyaline Casts, UA 3-6 /LPF      Methodology Automated Microscopy    Hemoglobin & Hematocrit, Blood [[id number]]  (Abnormal) Collected: 12/08/23 2154    Specimen: Blood Updated: 12/08/23 2224     Hemoglobin 4.1 g/dL      Hematocrit 11.8 %     Reticulocytes [[id number]]  (Abnormal) Collected: 12/08/23 2154    Specimen: Blood Updated: 12/09/23 0158     Reticulocyte % 2.01 %      Reticulocyte Absolute 0.0169 10*6/mm3              Imaging:    CT Abdomen Pelvis With Contrast    Result Date: 12/9/2023  PROCEDURE: CT ABDOMEN PELVIS W CONTRAST  COMPARISON: None  INDICATIONS: Abdominal pain  TECHNIQUE: After obtaining the patient's consent, CT images were created with non-ionic intravenous contrast material.   PROTOCOL:   Standard imaging protocol performed    RADIATION:   DLP: 282.6 mGy*cm   Automated exposure control was utilized to minimize radiation dose. CONTRAST: 80 cc Isovue 370 I.V.  FINDINGS:  No consolidations or pleural effusions are seen involving the lung bases.  The liver, spleen, and pancreas are unremarkable. The gallbladder and biliary tree are unremarkable. The bilateral adrenal glands are symmetric and unremarkable. The bilateral kidneys are symmetric and unremarkable.  No significant bowel dilatation or obstruction is seen. A normal-appearing appendix is observed. There is no evidence for acute appendicitis. No abnormal fluid collections are seen. No  significant free fluid is observed. No significant lymphadenopathy is seen throughout the abdomen or pelvis. The bladder is decompressed. The celiac and superior mesenteric arterial distributions are well opacified without evidence for occlusion.  No acute osseous abnormalities are seen.        1. No evidence for acute abnormality throughout the abdomen or pelvis.     PILY BROOKE MD       Electronically Signed and Approved By: PILY BROOKE MD on 12/09/2023 at 1:36                Differential Diagnosis and Discussion:    Weakness: Based on the patient's history, signs, and symptoms, the diffential diagnosis includes but is not limited to meningitis, stroke, sepsis, subarachnoid hemorrhage, intracranial bleeding, encephalitis, acute uti, dehydration, MS, myasthenia gravis, Guillan [place], migraine variant, neuromuscular disorders vertigo, electrolyte imbalance, and metabolic disorders.    All labs were reviewed and interpreted by me.  EKG was interpreted by me.  CT scan radiology impression was interpreted by me.    MDM     Amount and/or Complexity of Data Reviewed  Decide to obtain previous medical records or to obtain history from someone other than the patient: yes         Critical Care Note: Total Critical Care time of 35 minutes. Total critical care time documented does not include time spent on separately billed procedures for services of nurses or physician assistants. I personally saw and examined the patient. I have reviewed all diagnostic interpretations and treatment plans as written. I was present for the key portions of any procedures performed and the inclusive time noted in any critical care statement. Critical care time includes patient management by me, time spent at the patients bedside,  time to review lab and imaging results, discussing patient care, documentation in the medical record, and time spent with family or caregiver.        Patient Care Considerations:          Consultants/Shared  Management Plan:    Hospitalist: I have discussed the case with the hospitalist who agrees to accept the patient for admission.    Social Determinants of Health:    Patient has presented with family members who are responsible, reliable and will ensure follow up care.      Disposition and Care Coordination:    Admit:   Through independent evaluation of the patient's history, physical, and imperical data, the patient meets criteria for observation/admission to the hospital.        Final diagnoses:   Symptomatic anemia   Acute gastritis with hemorrhage, unspecified gastritis type        ED Disposition       ED Disposition   Decision to Admit    Condition   --    Comment   Level of Care: Critical Care [6]   Diagnosis: Anemia [[id number]]   Admitting Physician: /CAROLIN ARREOLA [[id number]]   Attending Physician: /CAROLIN ARREOLA [[id number]]                 This medical record created using voice recognition software.             Get Allison MD  12/09/23 0703

## 2023-12-09 NOTE — PLAN OF CARE
Goal Outcome Evaluation:      Patient admitted on this shift with baseline hemoglobin 4.1, 3rd unit of PRBC currnently infusing (patient to receive total of 4 units PRBC). Vital signs have been stable on this shift. Family is at the bedside. Patient requests to use family as  at this time instead of the  service (patient is [place] speaking). Education provided. No N/V/D or nery bleeding noted on this shift.

## 2023-12-10 LAB
ALBUMIN SERPL-MCNC: 3.4 G/DL (ref 3.5–5.2)
ALP SERPL-CCNC: 61 U/L (ref 39–117)
ALT SERPL W P-5'-P-CCNC: 26 U/L (ref 1–41)
ANION GAP SERPL CALCULATED.3IONS-SCNC: 10.5 MMOL/L (ref 5–15)
AST SERPL-CCNC: 43 U/L (ref 1–40)
BACTERIA SPEC AEROBE CULT: NORMAL
BASOPHILS # BLD AUTO: 0.02 10*3/MM3 (ref 0–0.2)
BASOPHILS NFR BLD AUTO: 0.5 % (ref 0–1.5)
BILIRUB CONJ SERPL-MCNC: 0.5 MG/DL (ref 0–0.3)
BILIRUB INDIRECT SERPL-MCNC: 1.5 MG/DL
BILIRUB SERPL-MCNC: 2 MG/DL (ref 0–1.2)
BUN SERPL-MCNC: 7 MG/DL (ref 8–23)
BUN/CREAT SERPL: 11.7 (ref 7–25)
CALCIUM SPEC-SCNC: 8.1 MG/DL (ref 8.6–10.5)
CHLORIDE SERPL-SCNC: 101 MMOL/L (ref 98–107)
CO2 SERPL-SCNC: 21.5 MMOL/L (ref 22–29)
CREAT SERPL-MCNC: 0.6 MG/DL (ref 0.76–1.27)
DEPRECATED RDW RBC AUTO: ABNORMAL FL
EGFRCR SERPLBLD CKD-EPI 2021: 107.8 ML/MIN/1.73
EOSINOPHIL # BLD AUTO: 0.13 10*3/MM3 (ref 0–0.4)
EOSINOPHIL NFR BLD AUTO: 3.2 % (ref 0.3–6.2)
ERYTHROCYTE [DISTWIDTH] IN BLOOD BY AUTOMATED COUNT: ABNORMAL %
GLUCOSE SERPL-MCNC: 97 MG/DL (ref 65–99)
HCT VFR BLD AUTO: 27.7 % (ref 37.5–51)
HGB BLD-MCNC: 9.8 G/DL (ref 13–17.7)
IMM GRANULOCYTES # BLD AUTO: 0.08 10*3/MM3 (ref 0–0.05)
IMM GRANULOCYTES NFR BLD AUTO: 2 % (ref 0–0.5)
LYMPHOCYTES # BLD AUTO: 1.23 10*3/MM3 (ref 0.7–3.1)
LYMPHOCYTES NFR BLD AUTO: 30.1 % (ref 19.6–45.3)
MAGNESIUM SERPL-MCNC: 1.9 MG/DL (ref 1.6–2.4)
MCH RBC QN AUTO: 34.4 PG (ref 26.6–33)
MCHC RBC AUTO-ENTMCNC: 35.4 G/DL (ref 31.5–35.7)
MCV RBC AUTO: 97.2 FL (ref 79–97)
MONOCYTES # BLD AUTO: 0.16 10*3/MM3 (ref 0.1–0.9)
MONOCYTES NFR BLD AUTO: 3.9 % (ref 5–12)
NEUTROPHILS NFR BLD AUTO: 2.46 10*3/MM3 (ref 1.7–7)
NEUTROPHILS NFR BLD AUTO: 60.3 % (ref 42.7–76)
NRBC BLD AUTO-RTO: 1.5 /100 WBC (ref 0–0.2)
PHOSPHATE SERPL-MCNC: 4.3 MG/DL (ref 2.5–4.5)
PLATELET # BLD AUTO: 45 10*3/MM3 (ref 140–450)
PMV BLD AUTO: ABNORMAL FL
POTASSIUM SERPL-SCNC: 3.9 MMOL/L (ref 3.5–5.2)
PROT SERPL-MCNC: 5.7 G/DL (ref 6–8.5)
RBC # BLD AUTO: 2.85 10*6/MM3 (ref 4.14–5.8)
SODIUM SERPL-SCNC: 133 MMOL/L (ref 136–145)
WBC NRBC COR # BLD AUTO: 4.08 10*3/MM3 (ref 3.4–10.8)

## 2023-12-10 PROCEDURE — 99406 BEHAV CHNG SMOKING 3-10 MIN: CPT | Performed by: INTERNAL MEDICINE

## 2023-12-10 PROCEDURE — 25010000002 THIAMINE HCL 200 MG/2ML SOLUTION 2 ML VIAL: Performed by: INTERNAL MEDICINE

## 2023-12-10 PROCEDURE — 99233 SBSQ HOSP IP/OBS HIGH 50: CPT | Performed by: INTERNAL MEDICINE

## 2023-12-10 PROCEDURE — P9100 PATHOGEN TEST FOR PLATELETS: HCPCS

## 2023-12-10 PROCEDURE — 80076 HEPATIC FUNCTION PANEL: CPT | Performed by: INTERNAL MEDICINE

## 2023-12-10 PROCEDURE — 25010000002 MAGNESIUM SULFATE 2 GM/50ML SOLUTION: Performed by: INTERNAL MEDICINE

## 2023-12-10 PROCEDURE — 80048 BASIC METABOLIC PNL TOTAL CA: CPT | Performed by: INTERNAL MEDICINE

## 2023-12-10 PROCEDURE — 36430 TRANSFUSION BLD/BLD COMPNT: CPT

## 2023-12-10 PROCEDURE — 83735 ASSAY OF MAGNESIUM: CPT | Performed by: INTERNAL MEDICINE

## 2023-12-10 PROCEDURE — P9035 PLATELET PHERES LEUKOREDUCED: HCPCS

## 2023-12-10 PROCEDURE — 85025 COMPLETE CBC W/AUTO DIFF WBC: CPT | Performed by: INTERNAL MEDICINE

## 2023-12-10 PROCEDURE — P9037 PLATE PHERES LEUKOREDU IRRAD: HCPCS

## 2023-12-10 PROCEDURE — 84100 ASSAY OF PHOSPHORUS: CPT | Performed by: INTERNAL MEDICINE

## 2023-12-10 RX ORDER — MAGNESIUM SULFATE HEPTAHYDRATE 40 MG/ML
2 INJECTION, SOLUTION INTRAVENOUS ONCE
Status: COMPLETED | OUTPATIENT
Start: 2023-12-10 | End: 2023-12-10

## 2023-12-10 RX ORDER — POLYETHYLENE GLYCOL 3350 17 G/17G
17 POWDER, FOR SOLUTION ORAL 3 TIMES DAILY
Status: DISPENSED | OUTPATIENT
Start: 2023-12-10 | End: 2023-12-11

## 2023-12-10 RX ORDER — SIMETHICONE 80 MG
80 TABLET,CHEWABLE ORAL
Status: DISCONTINUED | OUTPATIENT
Start: 2023-12-10 | End: 2023-12-11 | Stop reason: HOSPADM

## 2023-12-10 RX ORDER — POTASSIUM CHLORIDE 750 MG/1
40 CAPSULE, EXTENDED RELEASE ORAL ONCE
Status: COMPLETED | OUTPATIENT
Start: 2023-12-10 | End: 2023-12-10

## 2023-12-10 RX ORDER — GUAIFENESIN/DEXTROMETHORPHAN 100-10MG/5
5 SYRUP ORAL EVERY 4 HOURS PRN
Status: DISCONTINUED | OUTPATIENT
Start: 2023-12-10 | End: 2023-12-11 | Stop reason: HOSPADM

## 2023-12-10 RX ADMIN — SIMETHICONE 80 MG: 80 TABLET, CHEWABLE ORAL at 17:39

## 2023-12-10 RX ADMIN — MAGNESIUM SULFATE HEPTAHYDRATE 2 G: 40 INJECTION, SOLUTION INTRAVENOUS at 08:25

## 2023-12-10 RX ADMIN — POLYETHYLENE GLYCOL 3350 17 G: 17 POWDER, FOR SOLUTION ORAL at 21:10

## 2023-12-10 RX ADMIN — THIAMINE HYDROCHLORIDE 500 MG: 100 INJECTION, SOLUTION INTRAMUSCULAR; INTRAVENOUS at 22:20

## 2023-12-10 RX ADMIN — POLYETHYLENE GLYCOL 3350, SODIUM SULFATE ANHYDROUS, SODIUM BICARBONATE, SODIUM CHLORIDE, POTASSIUM CHLORIDE 4000 ML: 236; 22.74; 6.74; 5.86; 2.97 POWDER, FOR SOLUTION ORAL at 13:43

## 2023-12-10 RX ADMIN — POTASSIUM CHLORIDE 40 MEQ: 10 CAPSULE, COATED, EXTENDED RELEASE ORAL at 08:21

## 2023-12-10 RX ADMIN — FOLIC ACID 1 MG: 1 TABLET ORAL at 10:15

## 2023-12-10 RX ADMIN — DOCUSATE SODIUM 50MG AND SENNOSIDES 8.6MG 2 TABLET: 8.6; 5 TABLET, FILM COATED ORAL at 08:22

## 2023-12-10 RX ADMIN — SIMETHICONE 80 MG: 80 TABLET, CHEWABLE ORAL at 21:10

## 2023-12-10 RX ADMIN — GUAIFENESIN AND DEXTROMETHORPHAN 5 ML: 100; 10 SYRUP ORAL at 23:47

## 2023-12-10 RX ADMIN — THIAMINE HYDROCHLORIDE 500 MG: 100 INJECTION, SOLUTION INTRAMUSCULAR; INTRAVENOUS at 05:50

## 2023-12-10 RX ADMIN — MULTIPLE VITAMINS W/ MINERALS TAB 1 TABLET: TAB at 08:23

## 2023-12-10 RX ADMIN — THIAMINE HYDROCHLORIDE 500 MG: 100 INJECTION, SOLUTION INTRAMUSCULAR; INTRAVENOUS at 14:46

## 2023-12-10 RX ADMIN — POLYETHYLENE GLYCOL 3350 17 G: 17 POWDER, FOR SOLUTION ORAL at 14:46

## 2023-12-10 RX ADMIN — DOCUSATE SODIUM 50MG AND SENNOSIDES 8.6MG 2 TABLET: 8.6; 5 TABLET, FILM COATED ORAL at 21:11

## 2023-12-10 RX ADMIN — Medication 10 ML: at 21:14

## 2023-12-10 RX ADMIN — CYANOCOBALAMIN TAB 500 MCG 1000 MCG: 500 TAB at 08:23

## 2023-12-10 RX ADMIN — PANTOPRAZOLE SODIUM 40 MG: 40 TABLET, DELAYED RELEASE ORAL at 05:50

## 2023-12-10 RX ADMIN — Medication 10 ML: at 08:23

## 2023-12-10 NOTE — PLAN OF CARE
Goal Outcome Evaluation:              Outcome Evaluation: VSS. Pt had no complaints this shift. Colonoscopy scheduled monday, golytely administered. No siginificant changes. Continue plan of care.

## 2023-12-10 NOTE — PLAN OF CARE
Goal Outcome Evaluation:              Outcome Evaluation: VSS.  Hgb trending up.  Pending coloscopy Monday.  Care plan ongoing.

## 2023-12-10 NOTE — PROGRESS NOTES
[facility]   Hospitalist Progress Note    Date of admission: 12/8/2023  Patient Name: Terry Tena  1960  Date: 12/10/2023      Subjective     Chief Complaint   Patient presents with    Vomiting       Interval Followup: Feeling a lot better overall today, is in agreement with colonoscopy tomorrow.  Discussed results of treatment thus far.  No withdrawal symptoms.  Reflux symptoms are doing better.  Doing okay with current adjusted diet    Objective     Vitals:   Temp:  [97.7 °F (36.5 °C)-98.2 °F (36.8 °C)] 97.9 °F (36.6 °C)  Heart Rate:  [71-79] 73  Resp:  [16-18] 16  BP: (113-132)/(62-71) 123/66    Physical Exam  Awake in bed thin frail malnourished appearing appears more comfortable today family present  CTAB no wheezes on room air  RRR  Abdomen soft nontender nondistended  AOx3 answering appropriately      Result Review:  Vital signs, labs and recent relevant imaging reviewed.        acetaminophen **OR** acetaminophen    aluminum-magnesium hydroxide-simethicone    senna-docusate sodium **AND** polyethylene glycol **AND** bisacodyl **AND** bisacodyl    guaiFENesin-dextromethorphan    hydrOXYzine    influenza vaccine    Lidocaine    melatonin    nitroglycerin    ondansetron    prochlorperazine    sodium chloride    sodium chloride    folic acid, 1 mg, Oral, Daily  multivitamin with minerals, 1 tablet, Oral, Daily  nicotine, 1 patch, Transdermal, Q24H  pantoprazole, 40 mg, Oral, Q AM  senna-docusate sodium, 2 tablet, Oral, BID  sodium chloride, 10 mL, Intravenous, Q12H  thiamine (B-1) IV, 500 mg, Intravenous, Q8H   Followed by  [START ON 12/12/2023] thiamine (B-1) IV, 200 mg, Intravenous, Q8H   Followed by  [START ON 12/16/2023] thiamine, 100 mg, Oral, Daily  vitamin B-12, 1,000 mcg, Oral, Daily        US Abdomen Limited    Result Date: 12/9/2023    1. Negative study     FANTA AUSTIN M.D.       ELECTRONICALLY SIGNED AND APPROVED BY: FANTA AUSTIN M.D. ON 12/09/2023 AT 16:15             CT Abdomen  Pelvis With Contrast    Result Date: 12/9/2023    1. No evidence for acute abnormality throughout the abdomen or pelvis.     PILY BROOKE MD       Electronically Signed and Approved By: PILY BROOKE MD on 12/09/2023 at 1:36              Assessment / Plan     Summary: 63-year-old male with history of chronic alcohol dependence, pack-a-day smoking and no other prior past medical history who presented with nausea vomiting and chest discomfort over the past few days found to be acutely anemic with a hemoglobin of 4, admitted to the ICU, GI assisted with evaluation patient undergoing EGD 12/9 with findings of LA grade D reflux esophagitis and small hiatal hernia.    Assessment/Plan (clinically significant if listed here)  Symptomatic anemia concern for GI bleed as well as B12 deficiency, hypoproliferation in setting of chronic alcohol abuse/dependence  LA grade a reflux esophagitis  Small hiatal hernia  Elevated liver enzymes in setting of chronic alcohol abuse  Chronic pack-a-day nicotine dependence/smoking    Platelets decreased today, monitor has been somewhat variable, continue to monitor for bleeding, status post 4 units prbc, good initial response, has decreased on repeat today, probably equilibrating still, monitor trend  Platelet transfusion pending pre-colonoscopy as platelets are at 45   N.p.o. after midnight colonoscopy pending for tomorrow per discussion with GI appreciate assistance  monitor, suspect in setting of patient's alcohol use   Echo with normal EF no other acute abnormalities reported  Ultrasound abdomen surprisingly unremarkable with no cirrhosis or steatosis noted, normal size common bile duct.  Monitor LFTs T. bili still mildly elevated  Continue PPI for esophagitis  Continue B12 for severe deficiency, multivitamin, thiamine  Notably hyperproliferative RPI, suspect bone marrow suppression from his alcohol use, will check a peripheral smear as well  Pack-a-day smoking, smoking cessation  counseling, continue with nicotine patch, tolerating this well currently  Needs outpt low dose ct screening   PT/OT  Check a.m. CBC, BMP, magnesium, phosphorus, repeat lfts, hnh trend   Txf to floor to monitored bed, cont telemetry  D/w rn, gi     DVT prophylaxis:  Mechanical DVT prophylaxis orders are present.    Level Of Support Discussed With: Patient  Code Status (Patient has no pulse and is not breathing): CPR (Attempt to Resuscitate)  Medical Interventions (Patient has pulse or is breathing): Full Support      Smoking cessation counseling provided to patient for 4 minutes.  Patient has 50 pack year smoking history.  Discussed the risks of continued smoking including increased risk of cancer, lung disease, blood clots and worsening cardiovascular health.  Discussed options to help with smoking cessation including nicotine patches, lozenges, and pharmacotherapy.  Instructed patient on 1800-quit-now phone number to obtain additional resources and free patches/smoking cessation aids.   Patient is willing to quit smoking.  Stop date set for his hospitalization but did discuss if unable to maintain this and alcohol cessation he is going to focus on alcohol abstinence first and then taper off.          CBC          12/8/2023    21:32 12/8/2023    21:54 12/9/2023    11:42 12/9/2023    16:32 12/10/2023    04:10   CBC   WBC 4.40   4.19   4.08    RBC 0.86   2.79   2.85    Hemoglobin 4.1  4.1  9.6  11.6  9.8    Hematocrit 11.8  11.8  27.0  33.3  27.7    .2   96.8   97.2    MCH 47.7   34.4   34.4    MCHC 34.7   35.6   35.4    RDW 24.7        Platelets 71   72   45        CMP          12/8/2023    21:32 12/9/2023    11:42 12/10/2023    04:10   CMP   Glucose 115  99  97    BUN 10  10  7    Creatinine 0.72  0.64  0.60    EGFR 102.0  105.7  107.8    Sodium 131  134  133    Potassium 4.0  4.3  3.9    Chloride 96  102  101    Calcium 8.7  8.3  8.1    Total Protein 6.7   5.7    Albumin 4.4   3.4    Globulin 2.3      Total  Bilirubin 2.2   2.0    Alkaline Phosphatase 66   61    AST (SGOT) 60   43    ALT (SGPT) 34   26    Albumin/Globulin Ratio 1.9      BUN/Creatinine Ratio 13.9  15.6  11.7    Anion Gap 11.8  11.7  10.5

## 2023-12-10 NOTE — PROGRESS NOTES
Pulmonary / Critical Care Progress Note      Patient Name: Terry Tena  : 1960  MRN: [id number]  Attending:  Papi Coffey MD  Date of admission: 2023    Subjective   Subjective   Follow-up for anemia    Over past 24 hours: Out of ICU    This morning,  On room air  Lying in bed with family present  Hemoglobin remained stable  Creatinine improving    Objective   Objective     Vitals:   Temp:  [97.7 °F (36.5 °C)-98.7 °F (37.1 °C)] 97.7 °F (36.5 °C)  Heart Rate:  [69-82] 72  Resp:  [17-18] 18  BP: (113-139)/(63-86) 124/70    Physical Exam   Vital Signs Reviewed   General:  WDWN, Alert, NAD.  Pleasant male lying in bed  HEENT:  PERRL, EOMI.  OP, nares clear  Chest:  good aeration, clear to auscultation bilaterally, no work of breathing noted on room air  CV: RRR, no MGR, pulses 2+, equal.  Abd:  Soft, NT, ND, + BS, no HSM  EXT:  no clubbing, no cyanosis, no edema  Neuro:  A&Ox3, CN grossly intact, no focal deficits.  Skin: No rashes or lesions noted      Result Review    Result Review:  I have personally reviewed the results from the time of this admission to 12/10/2023 07:45 EST and agree with these findings:  [x]  Laboratory  [x]  Microbiology  [x]  Radiology  []  EKG/Telemetry   []  Cardiology/Vascular   []  Pathology  []  Old records  []  Other:  Most notable findings include:         Lab 12/10/23  0410 23  1632 23  1142 23  2154 23  2132   WBC 4.08  --  4.19  --  4.40   HEMOGLOBIN 9.8* 11.6* 9.6* 4.1* 4.1*   HEMATOCRIT 27.7* 33.3* 27.0* 11.8* 11.8*   PLATELETS 45*  --  72*  --  71*   SODIUM 133*  --  134*  --  131*   POTASSIUM 3.9  --  4.3  --  4.0   CHLORIDE 101  --  102  --  96*   CO2 21.5*  --  20.3*  --  23.2   BUN 7*  --  10  --  10   CREATININE 0.60*  --  0.64*  --  0.72*   GLUCOSE 97  --  99  --  115*   CALCIUM 8.1*  --  8.3*  --  8.7   PHOSPHORUS 4.3  --   --   --   --    TOTAL PROTEIN 5.7*  --   --   --  6.7   ALBUMIN 3.4*  --   --   --  4.4   GLOBULIN  --   --    --   --  2.3       Assessment & Plan   Assessment / Plan     Active Hospital Problems:  Active Hospital Problems    Diagnosis     **Anemia     Symptomatic anemia      Impression:  Acute symptomatic anemia.  Question blood loss versus chronic due to alcoholism.  Macrocytic  Question upper versus lower GI bleed  Alcoholism  Nausea/vomiting  Mildly elevated bilirubin  Tobacco abuse  .  Thrombocytopenia  Hypokalemia  Hypomagnesemia     Plan:  -Remain on room air.  Maintain SpO2 greater than 90%  -Patient s/p EGD.  No obvious evidence of overt bleeding.  Plans for colonoscopy Monday.  Appreciate GI assistance  -Continue Protonix  -Continue to monitor renal panel and electrolytes.  Replace magnesium intravenously and potassium orally  -Continue to monitor hemoglobin.  Transfuse for hemoglobin less than 7  -Encouraged tobacco cessation continue nicotine patch  -Encourage mobilization and incentive spirometer  -PT/OT on board.  Appreciate assistance  -Rest of care per primary       DVT prophylaxis:  Mechanical DVT prophylaxis orders are present.    CODE STATUS:   Level Of Support Discussed With: Patient  Code Status (Patient has no pulse and is not breathing): CPR (Attempt to Resuscitate)  Medical Interventions (Patient has pulse or is breathing): Full Support    Labs, imaging    , and medications personally reviewed.  Discussed with primary and patient    I, Dr. Ketan Renteria, have spent more than 50% of the total time managing the patient in this encounter today.  This included personally reviewing all pertinent labs, imaging, microbiology and documentation. Also discussing the case with the patient and any available family, the admitting physician and any available ancillary staff.      Electronically signed by Ketan Renteria MD, 12/10/23, 1:39 PM EST.  Electronically signed by MAYURI Sánchez, 12/10/23, 12:39 PM EST.

## 2023-12-11 ENCOUNTER — READMISSION MANAGEMENT (OUTPATIENT)
Dept: CALL CENTER | Facility: HOSPITAL | Age: 63
End: 2023-12-11
Payer: MEDICAID

## 2023-12-11 VITALS
BODY MASS INDEX: 18.77 KG/M2 | HEART RATE: 67 BPM | HEIGHT: 65 IN | DIASTOLIC BLOOD PRESSURE: 72 MMHG | RESPIRATION RATE: 16 BRPM | OXYGEN SATURATION: 100 % | WEIGHT: 112.66 LBS | SYSTOLIC BLOOD PRESSURE: 141 MMHG | TEMPERATURE: 97.7 F

## 2023-12-11 DIAGNOSIS — D50.9 IRON DEFICIENCY ANEMIA, UNSPECIFIED IRON DEFICIENCY ANEMIA TYPE: Primary | ICD-10-CM

## 2023-12-11 LAB
ALBUMIN SERPL-MCNC: 3.8 G/DL (ref 3.5–5.2)
ALBUMIN/GLOB SERPL: 1.7 G/DL
ALP SERPL-CCNC: 56 U/L (ref 39–117)
ALT SERPL W P-5'-P-CCNC: 22 U/L (ref 1–41)
ANION GAP SERPL CALCULATED.3IONS-SCNC: 9.4 MMOL/L (ref 5–15)
ANISOCYTOSIS BLD QL: ABNORMAL
AST SERPL-CCNC: 34 U/L (ref 1–40)
BASO STIPL COARSE BLD QL SMEAR: ABNORMAL
BILIRUB SERPL-MCNC: 1.6 MG/DL (ref 0–1.2)
BUN SERPL-MCNC: 4 MG/DL (ref 8–23)
BUN/CREAT SERPL: 7.5 (ref 7–25)
BURR CELLS BLD QL SMEAR: ABNORMAL
CALCIUM SPEC-SCNC: 8.4 MG/DL (ref 8.6–10.5)
CHLORIDE SERPL-SCNC: 106 MMOL/L (ref 98–107)
CO2 SERPL-SCNC: 21.6 MMOL/L (ref 22–29)
CREAT SERPL-MCNC: 0.53 MG/DL (ref 0.76–1.27)
CYTO UR: NORMAL
DEPRECATED RDW RBC AUTO: 50.4 FL (ref 37–54)
EGFRCR SERPLBLD CKD-EPI 2021: 111.9 ML/MIN/1.73
EOSINOPHIL # BLD MANUAL: 0.15 10*3/MM3 (ref 0–0.4)
EOSINOPHIL NFR BLD MANUAL: 4 % (ref 0.3–6.2)
ERYTHROCYTE [DISTWIDTH] IN BLOOD BY AUTOMATED COUNT: 25.2 % (ref 12.3–15.4)
GIANT PLATELETS: ABNORMAL
GLOBULIN UR ELPH-MCNC: 2.3 GM/DL
GLUCOSE SERPL-MCNC: 95 MG/DL (ref 65–99)
HCT VFR BLD AUTO: 27.7 % (ref 37.5–51)
HGB BLD-MCNC: 9.6 G/DL (ref 13–17.7)
LAB AP CASE REPORT: NORMAL
LAB AP CLINICAL INFORMATION: NORMAL
LARGE PLATELETS: ABNORMAL
LYMPHOCYTES # BLD MANUAL: 1.37 10*3/MM3 (ref 0.7–3.1)
LYMPHOCYTES NFR BLD MANUAL: 9 % (ref 5–12)
MACROCYTES BLD QL SMEAR: ABNORMAL
MAGNESIUM SERPL-MCNC: 2 MG/DL (ref 1.6–2.4)
MCH RBC QN AUTO: 34 PG (ref 26.6–33)
MCHC RBC AUTO-ENTMCNC: 34.7 G/DL (ref 31.5–35.7)
MCV RBC AUTO: 98.2 FL (ref 79–97)
MONOCYTES # BLD: 0.33 10*3/MM3 (ref 0.1–0.9)
NEUTROPHILS # BLD AUTO: 1.85 10*3/MM3 (ref 1.7–7)
NEUTROPHILS NFR BLD MANUAL: 50 % (ref 42.7–76)
NRBC SPEC MANUAL: 1 /100 WBC (ref 0–0.2)
OVALOCYTES BLD QL SMEAR: ABNORMAL
PATH REPORT.FINAL DX SPEC: NORMAL
PATH REPORT.GROSS SPEC: NORMAL
PHOSPHATE SERPL-MCNC: 3.7 MG/DL (ref 2.5–4.5)
PLATELET # BLD AUTO: 109 10*3/MM3 (ref 140–450)
PMV BLD AUTO: 12 FL (ref 6–12)
POIKILOCYTOSIS BLD QL SMEAR: ABNORMAL
POLYCHROMASIA BLD QL SMEAR: ABNORMAL
POTASSIUM SERPL-SCNC: 4.2 MMOL/L (ref 3.5–5.2)
PROT SERPL-MCNC: 6.1 G/DL (ref 6–8.5)
RBC # BLD AUTO: 2.82 10*6/MM3 (ref 4.14–5.8)
SCAN SLIDE: NORMAL
SMALL PLATELETS BLD QL SMEAR: ABNORMAL
SODIUM SERPL-SCNC: 137 MMOL/L (ref 136–145)
TARGETS BLD QL SMEAR: ABNORMAL
VARIANT LYMPHS NFR BLD MANUAL: 37 % (ref 19.6–45.3)
WBC MORPH BLD: NORMAL
WBC NRBC COR # BLD AUTO: 3.7 10*3/MM3 (ref 3.4–10.8)

## 2023-12-11 PROCEDURE — 85007 BL SMEAR W/DIFF WBC COUNT: CPT | Performed by: INTERNAL MEDICINE

## 2023-12-11 PROCEDURE — 85025 COMPLETE CBC W/AUTO DIFF WBC: CPT | Performed by: INTERNAL MEDICINE

## 2023-12-11 PROCEDURE — 80053 COMPREHEN METABOLIC PANEL: CPT | Performed by: INTERNAL MEDICINE

## 2023-12-11 PROCEDURE — 25010000002 THIAMINE HCL 200 MG/2ML SOLUTION 2 ML VIAL: Performed by: INTERNAL MEDICINE

## 2023-12-11 PROCEDURE — 97161 PT EVAL LOW COMPLEX 20 MIN: CPT

## 2023-12-11 PROCEDURE — 0DJD8ZZ INSPECTION OF LOWER INTESTINAL TRACT, VIA NATURAL OR ARTIFICIAL OPENING ENDOSCOPIC: ICD-10-PCS | Performed by: INTERNAL MEDICINE

## 2023-12-11 PROCEDURE — 25010000002 PROPOFOL 10 MG/ML EMULSION: Performed by: NURSE ANESTHETIST, CERTIFIED REGISTERED

## 2023-12-11 PROCEDURE — 99232 SBSQ HOSP IP/OBS MODERATE 35: CPT | Performed by: INTERNAL MEDICINE

## 2023-12-11 PROCEDURE — 84100 ASSAY OF PHOSPHORUS: CPT | Performed by: INTERNAL MEDICINE

## 2023-12-11 PROCEDURE — 25810000003 LACTATED RINGERS PER 1000 ML: Performed by: NURSE ANESTHETIST, CERTIFIED REGISTERED

## 2023-12-11 PROCEDURE — 83735 ASSAY OF MAGNESIUM: CPT | Performed by: INTERNAL MEDICINE

## 2023-12-11 PROCEDURE — 99239 HOSP IP/OBS DSCHRG MGMT >30: CPT | Performed by: INTERNAL MEDICINE

## 2023-12-11 RX ORDER — NICOTINE 21 MG/24HR
1 PATCH, TRANSDERMAL 24 HOURS TRANSDERMAL
Qty: 60 PATCH | Refills: 0 | Status: SHIPPED | OUTPATIENT
Start: 2023-12-12 | End: 2024-02-10

## 2023-12-11 RX ORDER — MULTIPLE VITAMINS W/ MINERALS TAB 9MG-400MCG
1 TAB ORAL DAILY
Qty: 360 EACH | Refills: 0 | Status: SHIPPED | OUTPATIENT
Start: 2023-12-12

## 2023-12-11 RX ORDER — SODIUM CHLORIDE, SODIUM LACTATE, POTASSIUM CHLORIDE, CALCIUM CHLORIDE 600; 310; 30; 20 MG/100ML; MG/100ML; MG/100ML; MG/100ML
INJECTION, SOLUTION INTRAVENOUS CONTINUOUS PRN
Status: DISCONTINUED | OUTPATIENT
Start: 2023-12-11 | End: 2023-12-11 | Stop reason: SURG

## 2023-12-11 RX ORDER — PANTOPRAZOLE SODIUM 40 MG/1
40 TABLET, DELAYED RELEASE ORAL
Qty: 90 TABLET | Refills: 0 | Status: SHIPPED | OUTPATIENT
Start: 2023-12-12 | End: 2024-03-11

## 2023-12-11 RX ORDER — PROPOFOL 10 MG/ML
VIAL (ML) INTRAVENOUS AS NEEDED
Status: DISCONTINUED | OUTPATIENT
Start: 2023-12-11 | End: 2023-12-11 | Stop reason: SURG

## 2023-12-11 RX ORDER — LIDOCAINE HYDROCHLORIDE 20 MG/ML
INJECTION, SOLUTION EPIDURAL; INFILTRATION; INTRACAUDAL; PERINEURAL AS NEEDED
Status: DISCONTINUED | OUTPATIENT
Start: 2023-12-11 | End: 2023-12-11 | Stop reason: SURG

## 2023-12-11 RX ADMIN — NICOTINE 1 PATCH: 21 PATCH, EXTENDED RELEASE TRANSDERMAL at 10:35

## 2023-12-11 RX ADMIN — SODIUM CHLORIDE, POTASSIUM CHLORIDE, SODIUM LACTATE AND CALCIUM CHLORIDE: 600; 310; 30; 20 INJECTION, SOLUTION INTRAVENOUS at 13:20

## 2023-12-11 RX ADMIN — Medication 10 ML: at 10:36

## 2023-12-11 RX ADMIN — ACETAMINOPHEN 650 MG: 325 TABLET ORAL at 16:42

## 2023-12-11 RX ADMIN — THIAMINE HYDROCHLORIDE 500 MG: 100 INJECTION, SOLUTION INTRAMUSCULAR; INTRAVENOUS at 14:32

## 2023-12-11 RX ADMIN — THIAMINE HYDROCHLORIDE 500 MG: 100 INJECTION, SOLUTION INTRAMUSCULAR; INTRAVENOUS at 06:38

## 2023-12-11 RX ADMIN — PROPOFOL 50 MG: 10 INJECTION, EMULSION INTRAVENOUS at 13:21

## 2023-12-11 RX ADMIN — PROPOFOL 200 MCG/KG/MIN: 10 INJECTION, EMULSION INTRAVENOUS at 13:21

## 2023-12-11 RX ADMIN — LIDOCAINE HYDROCHLORIDE 100 MG: 20 INJECTION, SOLUTION EPIDURAL; INFILTRATION; INTRACAUDAL; PERINEURAL at 13:21

## 2023-12-11 NOTE — PROGRESS NOTES
Pulmonary / Critical Care Progress Note      Patient Name: Terry Tena  : 1960  MRN: [id number]  Attending:  Papi Coffey MD  Date of admission: 2023    Subjective   Subjective   Follow-up for anemia    Over past 24 hours: Remains on Protonix.    This morning,  On room air  Lying in bed with family present  Hemoglobin remained stable  Creatinine improving  No complaints of abdominal pain  Platelet improved  Plans for colonoscopy today    Objective   Objective     Vitals:   Temp:  [97.5 °F (36.4 °C)-98.2 °F (36.8 °C)] 98.2 °F (36.8 °C)  Heart Rate:  [62-73] 62  Resp:  [16-18] 18  BP: (103-135)/(54-71) 103/54    Physical Exam   Vital Signs Reviewed   General:  WDWN, Alert, NAD.  Pleasant male lying in bed  HEENT:  PERRL, EOMI.  OP, nares clear  Chest:  good aeration, clear to auscultation bilaterally, no work of breathing noted on room air  CV: RRR, no MGR, pulses 2+, equal.  Abd:  Soft, NT, ND, + BS, no HSM  EXT:  no clubbing, no cyanosis, no edema  Neuro:  A&Ox3, CN grossly intact, no focal deficits.  Skin: No rashes or lesions noted    Result Review    Result Review:  I have personally reviewed the results from the time of this admission to 2023 07:08 EST and agree with these findings:  [x]  Laboratory  [x]  Microbiology  [x]  Radiology  []  EKG/Telemetry   []  Cardiology/Vascular   []  Pathology  []  Old records  []  Other:  Most notable findings include:         Lab 23  0401 12/10/23  0410 23  1632 23  1142 23  2154 23  2132   WBC 3.70 4.08  --  4.19  --  4.40   HEMOGLOBIN 9.6* 9.8* 11.6* 9.6* 4.1* 4.1*   HEMATOCRIT 27.7* 27.7* 33.3* 27.0* 11.8* 11.8*   PLATELETS 109* 45*  --  72*  --  71*   SODIUM 137 133*  --  134*  --  131*   POTASSIUM 4.2 3.9  --  4.3  --  4.0   CHLORIDE 106 101  --  102  --  96*   CO2 21.6* 21.5*  --  20.3*  --  23.2   BUN 4* 7*  --  10  --  10   CREATININE 0.53* 0.60*  --  0.64*  --  0.72*   GLUCOSE 95 97  --  99  --  115*   CALCIUM  8.4* 8.1*  --  8.3*  --  8.7   PHOSPHORUS 3.7 4.3  --   --   --   --    TOTAL PROTEIN 6.1 5.7*  --   --   --  6.7   ALBUMIN 3.8 3.4*  --   --   --  4.4   GLOBULIN 2.3  --   --   --   --  2.3       Assessment & Plan   Assessment / Plan     Active Hospital Problems:  Active Hospital Problems    Diagnosis     **Anemia     Symptomatic anemia      Impression:  Acute symptomatic anemia.  Question blood loss versus chronic due to alcoholism.  Macrocytic  Question upper versus lower GI bleed  Alcoholism  Nausea/vomiting  Mildly elevated bilirubin  Tobacco abuse  Thrombocytopenia  Hypokalemia  Hypomagnesemia     Plan:  -Remain on room air.  Maintain SpO2 greater than 90%  -Patient s/p EGD.  Plans for colonoscopy today  -Continue Protonix  -Continue to monitor renal panel and electrolytes.  Replace magnesium intravenously and potassium orally  -Continue to monitor hemoglobin.  Transfuse for hemoglobin less than 7  -Encouraged tobacco cessation continue nicotine patch  -Encourage mobilization and incentive spirometer  -PT/OT on board.  Appreciate assistance  -Rest of care per primary       DVT prophylaxis:  Mechanical DVT prophylaxis orders are present.    CODE STATUS:   Level Of Support Discussed With: Patient  Code Status (Patient has no pulse and is not breathing): CPR (Attempt to Resuscitate)  Medical Interventions (Patient has pulse or is breathing): Full Support    Labs, imaging, labs, provider notes and medications personally reviewed,.  Discussed with primary service and bedside nurse.    Electronically signed by MAYURI Sánchez, 12/11/23, 11:33 AM EST.    This visit was performed by BOTH a physician and an APC. I personally evaluated and examined the patient. I performed all aspects of MDM as documented. , I have reviewed and confirmed the accuracy of the patient's history as documented in this note., and I have reexamined the patient and the results are consistent with the previously documented exam. I  have updated the documentation as necessary.     Electronically signed by Eron Valenzuela MD, 12/11/23, 4:36 PM EST.       Electronically signed by Eron Valenzuela MD, 12/11/23, 7:09 AM EST.

## 2023-12-11 NOTE — THERAPY EVALUATION
Acute Care - Physical Therapy Initial Evaluation   [facility]     Patient Name: Terry Tena  : 1960  MRN: [id number]  Today's Date: 2023     Admit date: 2023     Referring Physician: Papi Coffey MD     Surgery Date:2023 - 2023   Procedure(s) (LRB):  COLONOSCOPY (N/A)          Visit Dx:     ICD-10-CM ICD-9-CM   1. Symptomatic anemia  D64.9 285.9   2. Acute gastritis with hemorrhage, unspecified gastritis type  K29.01 535.01   3. Difficulty in walking  R26.2 719.7     Patient Active Problem List   Diagnosis    Anemia    Symptomatic anemia     History reviewed. No pertinent past medical history.  Past Surgical History:   Procedure Laterality Date    ENDOSCOPY  2023    Procedure: ESOPHAGOGASTRODUODENOSCOPY AT BEDSIDE WITH ANESTHESIA AND BIOPSIES;  Surgeon: Alonzo Dow MD;  Location: [facility] ENDOSCOPY;  Service: Gastroenterology;;  ESOPHAGITIS, HIATAL HERNIA     PT Assessment (last 12 hours)       PT Evaluation and Treatment       Row Name 23 1148          Physical Therapy Time and Intention    Subjective Information no complaints  -MERRY     Document Type evaluation  -MERRY     Mode of Treatment individual therapy;physical therapy  -MERRY     Patient Effort good  -MERRY       Row Name 23 1148          General Information    Patient Observations alert;cooperative;agree to therapy  -MERRY     Prior Level of Function independent:;all household mobility;community mobility  -MERRY     Existing Precautions/Restrictions no known precautions/restrictions  -MERRY     Barriers to Rehab none identified  -MERRY       Row Name 23 1148          Living Environment    Current Living Arrangements home  -MERRY       Row Name 23 1148          Cognition    Orientation Status (Cognition) oriented x 3  -MERRY       Row Name 23 1148          Range of Motion (ROM)    Range of Motion ROM is WFL  -MERRY       Row Name 23 1148          Strength (Manual Muscle Testing)    Strength (Manual  Muscle Testing) strength is WFL  -MERRY       [facility] Name 12/11/23 1148          Bed Mobility    Bed Mobility bed mobility (all) activities;supine-sit  -MERRY     All Activities, [place] (Bed Mobility) independent  -MERRY     Supine-Sit [place] (Bed Mobility) independent  -MERRY       Row Name 12/11/23 1148          Transfers    Transfers sit-stand transfer  -MERRY       Row Name 12/11/23 1148          Sit-Stand Transfer    Sit-Stand [place] (Transfers) independent  -MERRY       Row Name 12/11/23 1148          Gait/Stairs (Locomotion)    Gait/Stairs Locomotion gait/ambulation independence  -MERRY     [place] Level (Gait) independent  -MERRY     Distance in Feet (Gait) 300  -MERRY       Row Name 12/11/23 1148          Balance    Balance Assessment standing dynamic balance  -MERRY     Dynamic Standing Balance independent  -MERRY       [facility] Name 12/11/23 1148          Plan of Care Review    Plan of Care Reviewed With patient  -MERRY     Outcome Evaluation Pt did not demonstrate any safety or mobility deficits during the initial evaluation.  Pt is safe to continue ambulating within their room independently until their discharge from the hospital.  Pt will be discharged from PT services at this time.  -MERRY       [facility] Name 12/11/23 1148          Therapy Assessment/Plan (PT)    Criteria for Skilled Interventions Met (PT) no problems identified which require skilled intervention  -MERRY     Therapy Frequency (PT) evaluation only  -MERRY       [facility] Name 12/11/23 1148          PT Evaluation Complexity    History, PT Evaluation Complexity no personal factors and/or comorbidities  -MERRY     Examination of Body Systems (PT Eval Complexity) total of 4 or more elements  -MERRY     Clinical Presentation (PT Evaluation Complexity) stable  -MERRY     Clinical Decision Making (PT Evaluation Complexity) low complexity  -MERRY     Overall Complexity (PT Evaluation Complexity) low complexity  -MERRY       Row Name 12/11/23 1148          Therapy Plan Review/Discharge Plan (PT)     Therapy Plan Review (PT) evaluation/treatment results reviewed;participants voiced agreement with care plan;participants included;patient  -MERRY       Row Name 12/11/23 1148          Physical Therapy Goals    Problem Specific Goal Selection (PT) problem specific goal 1, PT  -MERRY       Row Name 12/11/23 1148          Problem Specific Goal 1 (PT)    Problem Specific Goal 1 (PT) Complete PT evaluation  -MERRY     Time Frame (Problem Specific Goal 1, PT) 1 day  -MERRY     Progress/Outcome (Problem Specific Goal 1, PT) goal met  -MERRY               User Key  (r) = Recorded By, (t) = Taken By, (c) = Cosigned By      Initials Name Provider Type    Lenny Thrasher, PT Physical Therapist                      PT Recommendation and Plan  Anticipated Discharge Disposition (PT): home  Therapy Frequency (PT): evaluation only  Plan of Care Reviewed With: patient  Outcome Evaluation: Pt did not demonstrate any safety or mobility deficits during the initial evaluation.  Pt is safe to continue ambulating within their room independently until their discharge from the hospital.  Pt will be discharged from PT services at this time.   Outcome Measures       Row Name 12/11/23 1100             How much help from another person do you currently need...    Turning from your back to your side while in flat bed without using bedrails? 4  -MERRY      Moving from lying on back to sitting on the side of a flat bed without bedrails? 4  -MERRY      Moving to and from a bed to a chair (including a wheelchair)? 4  -MERRY      Standing up from a chair using your arms (e.g., wheelchair, bedside chair)? 4  -MERRY      Climbing 3-5 steps with a railing? 4  -MERRY      To walk in hospital room? 4  -MERRY      AM-PAC 6 Clicks Score (PT) 24  -MERRY      Highest Level of Mobility Goal 8 --> Walked 250 feet or more  -MERRY         Functional Assessment    Outcome Measure Options AM-PAC 6 Clicks Basic Mobility (PT)  -MERRY                User Key  (r) = Recorded By, (t) = Taken By, (c) =  Cosigned By      Initials Name Provider Type    Lenny Thrasher, PT Physical Therapist                     Time Calculation:    PT Charges       Row Name 12/11/23 1155             Time Calculation    PT Received On 12/11/23  -MERRY         Untimed Charges    PT Eval/Re-eval Minutes 20  -MERRY         Total Minutes    Untimed Charges Total Minutes 20  -MERRY       Total Minutes 20  -MERRY                User Key  (r) = Recorded By, (t) = Taken By, (c) = Cosigned By      Initials Name Provider Type    Lenny Thrasher, PT Physical Therapist                  Therapy Charges for Today       Code Description Service Date Service Provider Modifiers Qty    76493079425 HC PT EVAL LOW COMPLEXITY 2 12/11/2023 Lenny Adams, PT GP 1            PT G-Codes  Outcome Measure Options: AM-PAC 6 Clicks Basic Mobility (PT)  AM-PAC 6 Clicks Score (PT): 24    Lenny Adams, PERCY  12/11/2023

## 2023-12-11 NOTE — ANESTHESIA POSTPROCEDURE EVALUATION
Patient: Terry Tena    Procedure Summary       Date: 12/11/23 Room / Location: [facility] ENDOSCOPY 2 / [facility] ENDOSCOPY    Anesthesia Start: 1320 Anesthesia Stop: 1350    Procedure: COLONOSCOPY Diagnosis:       Symptomatic anemia      (Symptomatic anemia [D64.9])    Surgeons: Alonzo Dow MD Provider: Faye Greer CRNA    Anesthesia Type: general ASA Status: 3 - Emergent            Anesthesia Type: general    Vitals  Vitals Value Taken Time   BP 95/66 12/11/23 1403   Temp 36.6 °C (97.9 °F) 12/11/23 1403   Pulse 74 12/11/23 1406   Resp 17 12/11/23 1403   SpO2 98 % 12/11/23 1406   Vitals shown include unfiled device data.        Anesthesia Post Evaluation

## 2023-12-11 NOTE — CONSULTS
"Nutrition Services    Patient Name: Terry Tena  YOB: 1960  MRN: [id number]  Admission date: 12/8/2023      CLINICAL NUTRITION ASSESSMENT      Reason for Assessment  Physician Consult, MST Score 2+, and Identified at Risk by Screening Criteria      H&P:  History reviewed. No pertinent past medical history.     Current Problems:   Active Hospital Problems    Diagnosis     **Anemia     Symptomatic anemia         Nutrition/Diet History         Narrative   Pt is admitted d/t vomiting and anemia.     Pt is in Endo for colonoscopy today. Pt has CLL[employer] diet. No intake documented at this time.     Per chart review, pt has had significant wt loss x 9 months. BMI is 18.75. RD to f/u for MSA and NFPE and will provide nutrition intervention as needed.      Anthropometrics        Current Height, Weight Height: 165.1 cm (65\")  Weight: 51.1 kg (112 lb 10.5 oz)   Current BMI Body mass index is 18.75 kg/m².   BMI Classification Normal range   % IBW 82%   Adjusted Body Weight (ABW)    Weight Hx  Wt Readings from Last 30 Encounters:   12/11/23 0500 51.1 kg (112 lb 10.5 oz)   12/10/23 0524 55.5 kg (122 lb 5.7 oz)   12/09/23 0501 53.3 kg (117 lb 8.1 oz)   12/09/23 0200 51.8 kg (114 lb 3.2 oz)   12/08/23 2122 51.9 kg (114 lb 6.7 oz)   03/02/23 1634 59.5 kg (131 lb 2.8 oz)          Wt Change Observation -15% x 9 months--clinically significant      Estimated/Assessed Needs  Estimated Needs based on: Ideal Body Weight 62 kg       Energy Requirements 25-30 kcal/kg   EST Needs (kcal/day) 8691-4006       Protein Requirements 1.0 g/kg   EST Daily Needs (g/day) 62       Fluid Requirements 30 ml/kg    Estimated Needs (mL/day) 1860     Labs/Medications         Pertinent Labs Reviewed.   Results from last 7 days   Lab Units 12/11/23  0401 12/10/23  0410 12/09/23  1142 12/08/23  2132   SODIUM mmol/L 137 133* 134* 131*   POTASSIUM mmol/L 4.2 3.9 4.3 4.0   CHLORIDE mmol/L 106 101 102 96*   CO2 mmol/L 21.6* 21.5* 20.3* 23.2   BUN " "mg/dL 4* 7* 10 10   CREATININE mg/dL 0.53* 0.60* 0.64* 0.72*   CALCIUM mg/dL 8.4* 8.1* 8.3* 8.7   BILIRUBIN mg/dL 1.6* 2.0*  --  2.2*   ALK PHOS U/L 56 61  --  66   ALT (SGPT) U/L 22 26  --  34   AST (SGOT) U/L 34 43*  --  60*   GLUCOSE mg/dL 95 97 99 115*     Results from last 7 days   Lab Units 12/11/23  0401 12/10/23  0410 12/09/23  1632 12/09/23  1142   MAGNESIUM mg/dL 2.0 1.9  --  1.9   PHOSPHORUS mg/dL 3.7 4.3   < >  --    HEMOGLOBIN g/dL 9.6* 9.8*   < > 9.6*   HEMATOCRIT % 27.7* 27.7*   < > 27.0*    < > = values in this interval not displayed.     COVID19   Date Value Ref Range Status   12/08/2023 Not Detected Not Detected - Ref. Range Final     No results found for: \"HGBA1C\"      Pertinent Medications Reviewed.     Malnutrition Severity Assessment                Nutrition Diagnosis         Nutrition Dx Problem 1 Unintentional weight loss related to decreased ability to consume sufficient energy as evidenced by clear liquid diet.       Nutrition Intervention           Current Nutrition Orders & Evaluation of Intake       Oral Nutrition     Current PO Diet Diet: Liquid Diets; Clear Liquid; Fluid Consistency: Thin (IDDSI 0)   Supplement No active supplement orders         RD to f/u for NFPE, MSA     Medical Nutrition Therapy/Nutrition Education          Learner     Readiness Patient  Education not appropriate at this time     Method     Response N/A  N/A     Monitor/Evaluation        Monitor Per protocol, PO intake, Pertinent labs, Weight, GI status, POC/GOC, Diet advancement       Nutrition Discharge Plan         To be determined       Electronically signed by:  Minna Milner, ABUNDIO  12/11/23 14:39 EST    "

## 2023-12-11 NOTE — PLAN OF CARE
Goal Outcome Evaluation:  Plan of Care Reviewed With: patient, spouse        Progress: improving  Outcome Evaluation: Prep for colonoscopy complete.  Had a few episodes of coughing gave cough med.  VSS. Wife is at the bedside.  Care plan is ongoing.

## 2023-12-11 NOTE — DISCHARGE SUMMARY
[facility]         HOSPITALIST  DISCHARGE SUMMARY    Patient Name: Terry Tena    : 1960    MRN: [id number]    Date of Admission: 2023  Date of Discharge:  23  Primary Care Physician: Farooq Hernandez MD    Consults       Date and Time Order Name Status Description    2023  7:03 AM Inpatient Gastroenterology Consult Completed     2023  6:01 AM Inpatient Pulmonology Consult Completed     2023 11:22 PM Hospitalist (on-call MD unless specified)              Final Diagnosis:  Symptomatic anemia requiring 4 units PRBC with initial concern for GI bleed  Severe B12 deficiency with hypoproliferation in setting of chronic alcohol abuse/dependence   LA grade a reflux esophagitis per EGD  Small hiatal hernia  Elevated liver enzymes in setting of chronic alcohol abuse, resolving  Chronic pack-a-day nicotine dependence/smoking    Hospital Course     Hospital Course:  63-year-old male with history of chronic alcohol dependence, pack-a-day smoking and no other prior past medical history who presented with nausea vomiting and chest discomfort over the past few days found to be acutely anemic with a hemoglobin of 4, admitted to the ICU, GI assisted with evaluation patient undergoing EGD  with findings of LA grade D reflux esophagitis and small hiatal hernia.   Subsequent evaluation with colonoscopy was unremarkable for findings of bleeding.   Patient had not have any bleeding while in the hospital, hemoglobin remained stable from initial transfusion.  He was given a B12 injection for profound deficiency of less than 150, folate and iron studies were reasonable.  Continue on multivitamin/B12 repletion for discharge.  Follow-up outpatient with hematology also scheduled suspect with cessation of alcohol which is likely causing suppression and improving diet this should improve/resolve going forward.      Patient declined need for alcohol cessation medications or AA, his wife  is very supportive and can help him with continued alcohol cessation and patient is agreeable to quitting.  He also smokes 1 PPD, nicotine patch prescribed and resources given.    Patient discharged in stable condition with close PCP and hematology follow up.     Future Appointments   Date Time Provider Department Center   12/18/2023  4:00 PM Farooq Hernandez MD [facility]       Return precautions and follow up discussed and patient voiced agreement and understanding of treatment plan.       CODE STATUS:  Code Status and Medical Interventions:   Ordered at: 12/09/23 0032     Level Of Support Discussed With:    Patient     Code Status (Patient has no pulse and is not breathing):    CPR (Attempt to Resuscitate)     Medical Interventions (Patient has pulse or is breathing):    Full Support           Day of Discharge     Vital Signs:  Temp:  [97.5 °F (36.4 °C)-98.2 °F (36.8 °C)] 97.9 °F (36.6 °C)  Heart Rate:  [62-74] 73  Resp:  [14-26] 17  BP: ()/(54-71) 95/66  Flow (L/min):  [3] 3    Physical Exam  Gen: awake, resting in bed, conversant breathing Complan on room air no wheezes RRR abdomen soft nontender Aox3      Discharge Details        Discharge Medications        New Medications        Instructions Start Date   cyanocobalamin 1000 MCG tablet  Commonly known as: VITAMIN B-12   1,000 mcg, Oral, Daily   Start Date: December 12, 2023     multivitamin with minerals tablet tablet   1 tablet, Oral, Daily   Start Date: December 12, 2023     nicotine 21 MG/24HR patch  Commonly known as: NICODERM CQ   1 patch, Transdermal, Every 24 Hours Scheduled   Start Date: December 12, 2023     pantoprazole 40 MG EC tablet  Commonly known as: PROTONIX   40 mg, Oral, Every Early Morning   Start Date: December 12, 2023                Discharge Disposition:  Home or Self Care    Diet: patient counseled on dietary changes made during hospital and plans to  advance as tolerated     Discharge Activity: advance as  tolerated      Additional Instructions for the Follow-ups that You Need to Schedule       Ambulatory Referral to Hematology / Oncology   As directed      1-2 week f/u    Discharge Follow-up with PCP   As directed       Currently Documented PCP:    Farooq Hernandez MD    PCP Phone Number:    [phone]     Follow Up Details: 3-5 day establish care, f/u for anemia,                Pertinent  and/or Most Recent Results       LAB RESULTS:      Lab 12/11/23  0401 12/10/23  0410 12/09/23  1632 12/09/23  1142 12/08/23  2154 12/08/23  2132   WBC 3.70 4.08  --  4.19  --  4.40   HEMOGLOBIN 9.6* 9.8* 11.6* 9.6* 4.1* 4.1*   HEMATOCRIT 27.7* 27.7* 33.3* 27.0* 11.8* 11.8*   PLATELETS 109* 45*  --  72*  --  71*   NEUTROS ABS 1.85 2.46  --  2.48  --  2.31   IMMATURE GRANS (ABS)  --  0.08*  --  0.08*  --  0.11*   LYMPHS ABS  --  1.23  --  1.39  --  1.67   MONOS ABS  --  0.16  --  0.15  --  0.22   EOS ABS 0.15 0.13  --  0.08  --  0.09   MCV 98.2* 97.2*  --  96.8  --  137.2*   LACTATE  --   --   --   --   --  1.1   PROTIME  --   --   --   --   --  14.7   APTT  --   --   --   --   --  33.4*         Lab 12/11/23  0401 12/10/23  0410 12/09/23  1142 12/08/23  2132   SODIUM 137 133* 134* 131*   POTASSIUM 4.2 3.9 4.3 4.0   CHLORIDE 106 101 102 96*   CO2 21.6* 21.5* 20.3* 23.2   ANION GAP 9.4 10.5 11.7 11.8   BUN 4* 7* 10 10   CREATININE 0.53* 0.60* 0.64* 0.72*   EGFR 111.9 107.8 105.7 102.0   GLUCOSE 95 97 99 115*   CALCIUM 8.4* 8.1* 8.3* 8.7   MAGNESIUM 2.0 1.9 1.9  --    PHOSPHORUS 3.7 4.3  --   --          Lab 12/11/23  0401 12/10/23  0410 12/08/23 2132   TOTAL PROTEIN 6.1 5.7* 6.7   ALBUMIN 3.8 3.4* 4.4   GLOBULIN 2.3  --  2.3   ALT (SGPT) 22 26 34   AST (SGOT) 34 43* 60*   BILIRUBIN 1.6* 2.0* 2.2*   INDIRECT BILIRUBIN  --  1.5  --    BILIRUBIN DIRECT  --  0.5*  --    ALK PHOS 56 61 66   LIPASE  --   --  14         Lab 12/08/23 2132   HSTROP T 8   PROTIME 14.7   INR 1.13             Lab 12/08/23 2154 12/08/23 2132   IRON  --  193*    IRON SATURATION (TSAT)  --  64*   TIBC  --  302   TRANSFERRIN  --  203   FERRITIN  --  293.20   FOLATE  --  14.40   VITAMIN B 12  --  <150*   ABO TYPING A A   RH TYPING Positive Positive   ANTIBODY SCREEN Negative  --          Brief Urine Lab Results  (Last result in the past 365 days)        Color   Clarity   Blood   Leuk Est   Nitrite   Protein   CREAT   Urine HCG        12/08/23 2141 Dark Yellow   Clear   Negative   Trace   Negative   Negative                 Microbiology Results (last 10 days)       Procedure Component Value - Date/Time    Rapid Strep A Screen - Swab, Throat [[id number]]  (Normal) Collected: 12/08/23 2127    Lab Status: Final result Specimen: Swab from Throat Updated: 12/08/23 2150     Strep A Ag Negative    COVID PRE-OP / PRE-PROCEDURE SCREENING ORDER (NO ISOLATION) - Swab, Nasopharynx [[id number]]  (Normal) Collected: 12/08/23 2127    Lab Status: Final result Specimen: Swab from Nasopharynx Updated: 12/08/23 2208    Narrative:      The following orders were created for panel order COVID PRE-OP / PRE-PROCEDURE SCREENING ORDER (NO ISOLATION) - Swab, Nasopharynx.  Procedure                               Abnormality         Status                     ---------                               -----------         ------                     COVID-19, FLU A/B, RSV P...[[id number]]  Normal              Final result                 Please view results for these tests on the individual orders.    COVID-19, FLU A/B, RSV PCR 1 HR TAT - Swab, Nasopharynx [[id number]]  (Normal) Collected: 12/08/23 2127    Lab Status: Final result Specimen: Swab from Nasopharynx Updated: 12/08/23 2208     COVID19 Not Detected     Influenza A PCR Not Detected     Influenza B PCR Not Detected     RSV, PCR Not Detected    Narrative:      Fact sheet for providers: https://www.fda.gov/media/949588/download    Fact sheet for patients: https://www.fda.gov/media/469840/download    Test performed by PCR.    Beta Strep Culture, Throat -  Swab, Throat [[id number]]  (Normal) Collected: 12/08/23 2127    Lab Status: Final result Specimen: Swab from Throat Updated: 12/10/23 1237     Throat Culture, Beta Strep No Beta Hemolytic Streptococcus Isolated    Narrative:      Group A Strep incidence is low in adults. Positive culture for Beta hemolytic Streptococcus species can reflect colonization and not true infection. Please correlate clinically.            RADIOLOGY:  US Abdomen Limited    Result Date: 12/9/2023  Impression:   1. Negative study     FANTA AUSTIN M.D.       ELECTRONICALLY SIGNED AND APPROVED BY: FANTA AUSTIN M.D. ON 12/09/2023 AT 16:15             CT Abdomen Pelvis With Contrast    Result Date: 12/9/2023  Impression:   1. No evidence for acute abnormality throughout the abdomen or pelvis.     PILY BROOKE MD       Electronically Signed and Approved By: PILY BROOKE MD on 12/09/2023 at 1:36                      Results for orders placed during the hospital encounter of 12/08/23    Adult Transthoracic Echo Complete W/ Cont if Necessary Per Protocol    Interpretation Summary    Left ventricular ejection fraction appears to be 56 - 60%.      Labs Pending at Discharge:  Pending Labs       Order Current Status    Pathology Consultation In process    Tissue Pathology Exam In process              Time spent on Discharge including face to face service:  35 minutes

## 2023-12-11 NOTE — INTERVAL H&P NOTE
H&P reviewed. The patient was examined and there are no changes to the H&P.    Egd negative for bleeding  Will pursue colonoscopy

## 2023-12-11 NOTE — CASE MANAGEMENT/SOCIAL WORK
Discharge Planning Assessment   [facility]     Patient Name: Terry Tena  MRN: [id number]  Today's Date: 12/11/2023    Admit Date: 12/8/2023    Plan: Assessment completed at bedside with patient & spouse via [employer]. CM role explained and discharge planning discussed. Information on facesheet verified. PCP updated to Farooq Hernandez, patient is aware he has a previously scheduled appointment on Dec 18. Patient is enrolled in [address]. Patient reports he is independent in ADLs, works outside of the home. Patient does not use DME at baseline and denies the need for DME or HHC on discharge. Plan is to return home with spouse on discharge and will have transportation. Will continue to follow for discharge planning needs.   Discharge Needs Assessment       Row Name 12/11/23 1317       Living Environment    People in Home spouse    Current Living Arrangements apartment    Potentially Unsafe Housing Conditions none    Primary Care Provided by self    Provides Primary Care For no one    Family Caregiver if Needed spouse    Quality of Family Relationships helpful;involved;supportive    Able to Return to Prior Arrangements yes       Food Insecurity    Within the past 12 months, you worried that your food would run out before you got the money to buy more. Never true    Within the past 12 months, the food you bought just didn't last and you didn't have money to get more. Never true       Transition Planning    Patient/Family Anticipates Transition to home with family    Patient/Family Anticipated Services at Transition none    Transportation Anticipated family or friend will provide       Discharge Needs Assessment    Readmission Within the Last 30 Days no previous admission in last 30 days    Equipment Currently Used at Home none    Concerns to be Addressed denies needs/concerns at this time    Anticipated Changes Related to Illness none    Equipment Needed After Discharge none                   Discharge Plan        Row Name 12/11/23 1317       Plan    Plan Assessment completed at bedside with patient & spouse via [employer]. CM role explained and discharge planning discussed. Information on facesheet verified. PCP updated to Farooq Hernandez, patient is aware he has a previously scheduled appointment on Dec 18. Patient is enrolled in [address]. Patient reports he is independent in ADLs, works outside of the home. Patient does not use DME at baseline and denies the need for DME or HHC on discharge. Plan is to return home with spouse on discharge and will have transportation. Will continue to follow for discharge planning needs.    Patient/Family in Agreement with Plan yes                  Continued Care and Services - Admitted Since 12/8/2023    Coordination has not been started for this encounter.          Demographic Summary       Row Name 12/11/23 6844       General Information    Admission Type inpatient    Arrived From home    Referral Source admission list    Reason for Consult discharge planning    Preferred Language Yemeni       Contact Information    Permission Granted to Share Info With pharmacist/pharmacy;family/designee;                   Functional Status       Row Name 12/11/23 1314       Functional Status    Usual Activity Tolerance good    Current Activity Tolerance good       Functional Status, IADL    Medications independent    Meal Preparation independent    Housekeeping independent    Laundry independent    Shopping independent       Mental Status    General Appearance WDL WDL       Mental Status Summary    Recent Changes in Mental Status/Cognitive Functioning no changes                   Psychosocial    No documentation.                  Abuse/Neglect    No documentation.                  Legal    No documentation.                  Substance Abuse    No documentation.                  Patient Forms    No documentation.                     Viky Montgomery RN

## 2023-12-12 ENCOUNTER — TRANSITIONAL CARE MANAGEMENT TELEPHONE ENCOUNTER (OUTPATIENT)
Dept: CALL CENTER | Facility: HOSPITAL | Age: 63
End: 2023-12-12
Payer: MEDICAID

## 2023-12-12 LAB
CYTO UR: NORMAL
LAB AP CASE REPORT: NORMAL
LAB AP CLINICAL INFORMATION: NORMAL
LAB AP SPECIAL STAINS: NORMAL
PATH REPORT.FINAL DX SPEC: NORMAL
PATH REPORT.GROSS SPEC: NORMAL

## 2023-12-12 NOTE — OUTREACH NOTE
Prep Survey      Flowsheet Row Responses   [facility] facility patient discharged from? [facility]   Is LACE score < 7 ? Yes   Eligibility [facility]   Date of Admission 12/08/23   Date of Discharge 12/11/23   Discharge Disposition Home or Self Care   Discharge diagnosis symptomatic anemia, severe B12 deficiency   Does the patient have one of the following disease processes/diagnoses(primary or secondary)? Other   Does the patient have Home health ordered? No   Is there a DME ordered? No   General alerts for this patient Kazakh SPEAKING   Prep survey completed? Yes            Jaleesa JC - Registered Nurse

## 2023-12-12 NOTE — OUTREACH NOTE
Call Center TCM Note      Flowsheet Row Responses   [facility] [place] patient discharged from? [facility]   Does the patient have one of the following disease processes/diagnoses(primary or secondary)? Other   TCM attempt successful? No   Unsuccessful attempts Attempt 1  [Attempted to reach patient using PI # [id number] without success. Attempted home and mobile numbers listed. No PCP verbal release to attempt other contact.]            Enid Chase RN    12/12/2023, 11:02 EST

## 2023-12-12 NOTE — OUTREACH NOTE
Call Center TCM Note      Flowsheet Row Responses   [facility] patient discharged from? [facility]   Does the patient have one of the following disease processes/diagnoses(primary or secondary)? Other   TCM attempt successful? No   Unsuccessful attempts Attempt 2  [Called both home and mobile numbers again with no answer.]            Enid Chase RN    12/12/2023, 13:12 EST

## 2023-12-13 ENCOUNTER — TRANSITIONAL CARE MANAGEMENT TELEPHONE ENCOUNTER (OUTPATIENT)
Dept: CALL CENTER | Facility: HOSPITAL | Age: 63
End: 2023-12-13
Payer: MEDICAID

## 2023-12-13 NOTE — OUTREACH NOTE
Call Center TCM Note      Flowsheet Row Responses   [facility] patient discharged from? [facility]   Does the patient have one of the following disease processes/diagnoses(primary or secondary)? Other   TCM attempt successful? No   Unsuccessful attempts Attempt 3   Wrap up additional comments D/C DX: symptomatic anemia, severe B12 deficiency - Unable to reach pt x 3 attempts for TCM call. Pt is sched to establish care with PCP Dr Hernandez on 12/18/2023.            Enid Jara MA    12/13/2023, 10:21 EST

## 2023-12-14 LAB
BH BB BLOOD EXPIRATION DATE: NORMAL
BH BB BLOOD EXPIRATION DATE: NORMAL
BH BB BLOOD TYPE BARCODE: 6200
BH BB BLOOD TYPE BARCODE: 6200
BH BB DISPENSE STATUS: NORMAL
BH BB DISPENSE STATUS: NORMAL
BH BB PRODUCT CODE: NORMAL
BH BB PRODUCT CODE: NORMAL
BH BB UNIT NUMBER: NORMAL
BH BB UNIT NUMBER: NORMAL
UNIT  ABO: NORMAL
UNIT  ABO: NORMAL
UNIT  RH: NORMAL
UNIT  RH: NORMAL

## 2023-12-15 LAB
BH BB BLOOD EXPIRATION DATE: NORMAL
BH BB BLOOD TYPE BARCODE: 6200
BH BB DISPENSE STATUS: NORMAL
BH BB PRODUCT CODE: NORMAL
BH BB UNIT NUMBER: NORMAL
CROSSMATCH INTERPRETATION: NORMAL
UNIT  ABO: NORMAL
UNIT  RH: NORMAL

## 2023-12-18 ENCOUNTER — OFFICE VISIT (OUTPATIENT)
Dept: FAMILY MEDICINE CLINIC | Facility: CLINIC | Age: 63
End: 2023-12-18
Payer: MEDICAID

## 2023-12-18 VITALS
HEART RATE: 79 BPM | WEIGHT: 124.4 LBS | OXYGEN SATURATION: 100 % | HEIGHT: 65 IN | BODY MASS INDEX: 20.73 KG/M2 | SYSTOLIC BLOOD PRESSURE: 121 MMHG | DIASTOLIC BLOOD PRESSURE: 65 MMHG

## 2023-12-18 DIAGNOSIS — D51.8 VITAMIN B12 DEFICIENCY (DIETARY) ANEMIA: ICD-10-CM

## 2023-12-18 DIAGNOSIS — Z09 HOSPITAL DISCHARGE FOLLOW-UP: Primary | ICD-10-CM

## 2023-12-18 DIAGNOSIS — Z87.891 HISTORY OF SMOKING: ICD-10-CM

## 2023-12-18 RX ORDER — CYANOCOBALAMIN 1000 UG/ML
1000 INJECTION, SOLUTION INTRAMUSCULAR; SUBCUTANEOUS WEEKLY
Status: SHIPPED | OUTPATIENT
Start: 2023-12-18 | End: 2024-01-15

## 2023-12-18 RX ADMIN — CYANOCOBALAMIN 1000 MCG: 1000 INJECTION, SOLUTION INTRAMUSCULAR; SUBCUTANEOUS at 16:50

## 2023-12-18 NOTE — PROGRESS NOTES
Subjective:       Terry Tena is a 63 y.o. male who presents for hospital discharge follow-up for anemia.    Mr. Tena was recently admitted to our hospital earlier this month from 12/8/2023 to 12/11/2023. I have reviewed the discharge summary from that admission by Dr. Coffey.     Per discharge summary, Mr. Tena initially presented complaining of nausea and vomiting and was admitted for symptomatic anemia with hemoglobin of 4 requiring multiple blood transfusions.    Gastroenterology was consulted, and he subsequently underwent EGD and colonoscopy.  I have reviewed the results of those reports.    EGD performed by Dr. Dow demonstrated reflux esophagitis without bleeding and a small hiatal hernia.  Colonoscopy was performed by Dr. Dow and entire colon was determined to be normal. Dr. Dorsey recommended Ms. Tena continue Protonix and be seen by hematologist.    Mr. Tena was found to have profound vitamin B12 deficiency, which was thought to be the etiology of his anemia, and was given a one-time vitamin B12 injection.    Per discharge summary, hemoglobin subsequently remained stable.    Today, Mr. Tena tells me he has had some fatigue but his symptoms are overall significantly improved from his admission.  He has been taking oral vitamin-B12 replacement.  He is taking pantoprazole 40 mg extended release and has been compliant with this.  He does not currently have hematology appointment scheduled.  He is abstinent of alcohol and says he quit drinking 21 days ago and family member confirms this.  He is also stopped smoking.  I congratulated him on his abstinence from tobacco and alcohol.    I have reviewed the literature regarding treatment of vitamin B12 deficiency.  For patients with symptomatic or severe anemia with a hemoglobin of less than 8, initial nirav Herring intramuscular therapy would consist of 1000 mcg of vitamin B12 intramuscularly 1-3 times weekly or once  daily during the first week.  He is now on week 2 and this would entail 1000 mcg intramuscularly once a week for 4 weeks.  I will order his second vitamin B12 shot to be given here in the clinic today and get him set up for regular shots for the next 3 weeks with an appointment with me in 1 month to reassess.  At 4 weeks, we will discuss maintenance therapy which is generally 1000 mcg monthly IM dosage with CBC every 6 months for the first year, then annually.    Guidelines also recommend repeating CBC in 2 weeks.    I discussed these recommendations with the patient and his daughter.  I will see him back in 4 weeks.    It does not appear he has ever had a cholesterol panel checked and I would obtain this on him today.  We reviewed his other labs and imaging from the hospital.    The following portions of the patient's history were reviewed and updated as appropriate: allergies, current medications, past family history, past medical history, past social history, past surgical history, and problem list.    Past Medical Hx:  History reviewed. No pertinent past medical history.    Past Surgical Hx:  Past Surgical History:   Procedure Laterality Date    COLONOSCOPY N/A 12/11/2023    Procedure: COLONOSCOPY;  Surgeon: Alonzo Dow MD;  Location: [facility] ENDOSCOPY;  Service: Gastroenterology;  Laterality: N/A;  HEMORRHOIDS    ENDOSCOPY  12/9/2023    Procedure: ESOPHAGOGASTRODUODENOSCOPY AT BEDSIDE WITH ANESTHESIA AND BIOPSIES;  Surgeon: Alonzo Dow MD;  Location: [facility] ENDOSCOPY;  Service: Gastroenterology;;  ESOPHAGITIS, HIATAL HERNIA       Current Meds:    Current Outpatient Medications:     multivitamin with minerals tablet tablet, Take 1 tablet by mouth Daily., Disp: 360 each, Rfl: 0    nicotine (NICODERM CQ) 21 MG/24HR patch, Place 1 patch on the skin as directed by provider Daily for 60 doses., Disp: 60 patch, Rfl: 0    pantoprazole (PROTONIX) 40 MG EC tablet, Take 1 tablet by mouth Every Morning  "for 90 days., Disp: 90 tablet, Rfl: 0    Current Facility-Administered Medications:     cyanocobalamin injection 1,000 mcg, 1,000 mcg, Intramuscular, Weekly, Farooq Hernandez MD    Allergies:  No Known Allergies    Family Hx:  Family History   Problem Relation Age of Onset    Heart attack Brother     Cancer Mother         Social History:  Social History     Socioeconomic History    Marital status: Single   Tobacco Use    Smoking status: Former     Packs/day: 1.00     Years: 50.00     Additional pack years: 0.00     Total pack years: 50.00     Types: Cigarettes    Smokeless tobacco: Never   Vaping Use    Vaping Use: Never used   Substance and Sexual Activity    Alcohol use: Yes     Alcohol/week: 42.0 standard drinks of alcohol     Types: 42 Cans of beer per week    Drug use: Not Currently    Sexual activity: Defer       Review of Systems  Review of Systems   Constitutional:  Negative for fatigue.   Respiratory:  Negative for shortness of breath.    Cardiovascular:  Positive for leg swelling (some lower extremity pitting edema). Negative for chest pain.   Neurological:  Negative for dizziness, weakness and light-headedness.       Objective:     /65   Pulse 79   Ht 165.1 cm (65\")   Wt 56.4 kg (124 lb 6.4 oz)   SpO2 100%   BMI 20.70 kg/m²   Physical Exam  Constitutional:       General: He is not in acute distress.     Appearance: Normal appearance. He is normal weight. He is not ill-appearing, toxic-appearing or diaphoretic.   HENT:      Head: Normocephalic and atraumatic.      Mouth/Throat:      Mouth: Mucous membranes are moist.      Pharynx: Oropharynx is clear. No oropharyngeal exudate or posterior oropharyngeal erythema.   Eyes:      Extraocular Movements: Extraocular movements intact.   Cardiovascular:      Rate and Rhythm: Normal rate and regular rhythm.   Pulmonary:      Effort: Pulmonary effort is normal. No respiratory distress.      Breath sounds: Normal breath sounds.   Abdominal:      General: " Bowel sounds are normal. There is no distension.      Tenderness: There is no abdominal tenderness. There is no guarding.   Skin:     General: Skin is warm and dry.      Coloration: Skin is not jaundiced.   Neurological:      Mental Status: He is alert.   Psychiatric:         Mood and Affect: Mood normal.         Behavior: Behavior normal.          Assessment/Plan:     Diagnoses and all orders for this visit:    1. Hospital discharge follow-up (Primary) + 2. Vitamin B12 deficiency (dietary) anemia    Per discharge summary, Mr. Tena initially presented complaining of nausea and vomiting and was admitted for symptomatic anemia with hemoglobin of 4 requiring multiple blood transfusions.    Gastroenterology was consulted, and he subsequently underwent EGD and colonoscopy.  I have reviewed the results of those reports.    EGD performed by Dr. Dow demonstrated reflux esophagitis without bleeding and a small hiatal hernia.  Colonoscopy was performed by Dr. Dow and entire colon was determined to be normal. Dr. Dorsey recommended Ms. Tena continue Protonix and be seen by hematologist.    Mr. Tena was found to have profound vitamin B12 deficiency, which was thought to be the etiology of his anemia, and was given a one-time vitamin B12 injection.    Per discharge summary, hemoglobin subsequently remained stable.    Today, Mr. Tena tells me he has had some fatigue but his symptoms are overall significantly improved from his admission.  He has been taking oral vitamin-B12 replacement.  He is taking pantoprazole 40 mg extended release and has been compliant with this.  He does not currently have hematology appointment scheduled.  He is abstinent of alcohol and says he quit drinking 21 days ago and family member confirms this.  He is also stopped smoking.  I congratulated him on his abstinence from tobacco and alcohol.    I have reviewed the literature regarding treatment of vitamin B12 deficiency.   For patients with symptomatic or severe anemia with a hemoglobin of less than 8, initial parent Nima intramuscular therapy would consist of 1000 mcg of vitamin B12 intramuscularly 1-3 times weekly or once daily during the first week.  He is now on week 2 and this would entail 1000 mcg intramuscularly once a week for 4 weeks.  I will order his second vitamin B12 shot to be given here in the clinic today and get him set up for regular shots for the next 3 weeks with an appointment with me in 1 month to reassess.  At 4 weeks, we will discuss maintenance therapy which is generally 1000 mcg monthly IM dosage with CBC every 6 months for the first year, then annually.    Guidelines also recommend repeating CBC in 2 weeks.    I discussed these recommendations with the patient and his daughter.  I will see him back in 4 weeks.        -     cyanocobalamin injection 1,000 mcg  -     CBC No Differential; Future    3. History of smoking    It does not appear he has ever had a cholesterol panel checked and I would obtain this on him today.  We reviewed his other labs and imaging from the hospital.    -     Lipid panel; Future          Rx changes: Starting intramuscular vitamin B12 replacement due to symptomatic anemia from profound vitamin B12 deficiency    Follow-up:     Return in about 4 weeks (around 1/15/2024) for B-12 Deficiency Anemia .    Preventative:  Health Maintenance   Topic Date Due    ANNUAL PHYSICAL  Never done    COVID-19 Vaccine (1) 12/19/2023 (Originally 2/4/1961)    Pneumococcal Vaccine 0-64 (1 - PCV) 12/19/2023 (Originally 8/4/1966)    INFLUENZA VACCINE  12/19/2023 (Originally 8/1/2023)    TDAP/TD VACCINES (1 - Tdap) 12/19/2023 (Originally 8/4/1978)    ZOSTER VACCINE (1 of 2) 12/19/2023 (Originally 8/4/2009)    LUNG CANCER SCREENING  03/02/2024    COLORECTAL CANCER SCREENING  12/11/2033    HEPATITIS C SCREENING  Completed         This document has been electronically signed by Farooq Hernandez MD on December  18, 2023 16:48 EST       Parts of this note are electronic transcriptions/translations of spoken language to printed text using the Dragon Dictation system.

## 2023-12-26 ENCOUNTER — CLINICAL SUPPORT (OUTPATIENT)
Dept: FAMILY MEDICINE CLINIC | Facility: CLINIC | Age: 63
End: 2023-12-26
Payer: MEDICAID

## 2023-12-26 DIAGNOSIS — E53.8 B12 DEFICIENCY: Primary | ICD-10-CM

## 2023-12-26 RX ADMIN — CYANOCOBALAMIN 1000 MCG: 1000 INJECTION, SOLUTION INTRAMUSCULAR; SUBCUTANEOUS at 15:44

## 2024-01-02 ENCOUNTER — CLINICAL SUPPORT (OUTPATIENT)
Dept: FAMILY MEDICINE CLINIC | Facility: CLINIC | Age: 64
End: 2024-01-02
Payer: MEDICAID

## 2024-01-02 ENCOUNTER — DOCUMENTATION (OUTPATIENT)
Dept: FAMILY MEDICINE CLINIC | Facility: CLINIC | Age: 64
End: 2024-01-02
Payer: MEDICAID

## 2024-01-02 DIAGNOSIS — E53.8 B12 DEFICIENCY: Primary | ICD-10-CM

## 2024-01-02 DIAGNOSIS — D51.8 VITAMIN B12 DEFICIENCY (DIETARY) ANEMIA: ICD-10-CM

## 2024-01-02 RX ORDER — CYANOCOBALAMIN 1000 UG/ML
1000 INJECTION, SOLUTION INTRAMUSCULAR; SUBCUTANEOUS WEEKLY
Status: COMPLETED | OUTPATIENT
Start: 2024-01-02 | End: 2024-01-02

## 2024-01-02 RX ADMIN — CYANOCOBALAMIN 1000 MCG: 1000 INJECTION, SOLUTION INTRAMUSCULAR; SUBCUTANEOUS at 15:26

## 2024-01-02 NOTE — PROGRESS NOTES
I spoke with my medical assistant Selena and told her that patient could receive vitamin B12 shot today.          This document has been electronically signed by Farooq Hernandez MD on January 2, 2024 15:18 EST

## 2024-01-08 ENCOUNTER — CLINICAL SUPPORT (OUTPATIENT)
Dept: FAMILY MEDICINE CLINIC | Facility: CLINIC | Age: 64
End: 2024-01-08
Payer: MEDICAID

## 2024-01-08 DIAGNOSIS — E53.8 B12 DEFICIENCY: Primary | ICD-10-CM

## 2024-01-08 PROCEDURE — 96372 THER/PROPH/DIAG INJ SC/IM: CPT | Performed by: STUDENT IN AN ORGANIZED HEALTH CARE EDUCATION/TRAINING PROGRAM

## 2024-01-08 RX ORDER — FOLIC ACID 1 MG/1
1 TABLET ORAL DAILY
COMMUNITY
Start: 2023-12-21 | End: 2024-01-15 | Stop reason: SDUPTHER

## 2024-01-08 RX ORDER — CYANOCOBALAMIN 1000 UG/ML
1000 INJECTION, SOLUTION INTRAMUSCULAR; SUBCUTANEOUS
Status: DISCONTINUED | OUTPATIENT
Start: 2024-01-08 | End: 2024-01-09

## 2024-01-08 RX ADMIN — CYANOCOBALAMIN 1000 MCG: 1000 INJECTION, SOLUTION INTRAMUSCULAR; SUBCUTANEOUS at 16:45

## 2024-01-09 RX ORDER — CYANOCOBALAMIN 1000 UG/ML
1000 INJECTION, SOLUTION INTRAMUSCULAR; SUBCUTANEOUS ONCE
Status: DISCONTINUED | OUTPATIENT
Start: 2024-01-09 | End: 2024-01-09

## 2024-01-09 RX ORDER — CYANOCOBALAMIN 1000 UG/ML
1000 INJECTION, SOLUTION INTRAMUSCULAR; SUBCUTANEOUS ONCE
Status: DISCONTINUED | OUTPATIENT
Start: 2024-01-08 | End: 2024-01-15

## 2024-01-12 DIAGNOSIS — D51.8 VITAMIN B12 DEFICIENCY (DIETARY) ANEMIA: Primary | ICD-10-CM

## 2024-01-15 ENCOUNTER — LAB (OUTPATIENT)
Dept: LAB | Facility: HOSPITAL | Age: 64
End: 2024-01-15
Payer: MEDICAID

## 2024-01-15 ENCOUNTER — OFFICE VISIT (OUTPATIENT)
Dept: FAMILY MEDICINE CLINIC | Facility: CLINIC | Age: 64
End: 2024-01-15
Payer: MEDICAID

## 2024-01-15 VITALS
OXYGEN SATURATION: 98 % | BODY MASS INDEX: 20.16 KG/M2 | DIASTOLIC BLOOD PRESSURE: 70 MMHG | SYSTOLIC BLOOD PRESSURE: 123 MMHG | HEIGHT: 65 IN | HEART RATE: 94 BPM | WEIGHT: 121 LBS

## 2024-01-15 DIAGNOSIS — D51.8 VITAMIN B12 DEFICIENCY (DIETARY) ANEMIA: ICD-10-CM

## 2024-01-15 DIAGNOSIS — Z87.891 HISTORY OF SMOKING: ICD-10-CM

## 2024-01-15 DIAGNOSIS — R05.8 PRODUCTIVE COUGH: ICD-10-CM

## 2024-01-15 DIAGNOSIS — D51.8 VITAMIN B12 DEFICIENCY (DIETARY) ANEMIA: Primary | ICD-10-CM

## 2024-01-15 LAB
CHOLEST SERPL-MCNC: 103 MG/DL (ref 0–200)
DEPRECATED RDW RBC AUTO: 43.6 FL (ref 37–54)
ERYTHROCYTE [DISTWIDTH] IN BLOOD BY AUTOMATED COUNT: 13.8 % (ref 12.3–15.4)
HCT VFR BLD AUTO: 32.6 % (ref 37.5–51)
HDLC SERPL-MCNC: 30 MG/DL (ref 40–60)
HGB BLD-MCNC: 10.5 G/DL (ref 13–17.7)
LDLC SERPL CALC-MCNC: 60 MG/DL (ref 0–100)
LDLC/HDLC SERPL: 2.06 {RATIO}
MCH RBC QN AUTO: 29.4 PG (ref 26.6–33)
MCHC RBC AUTO-ENTMCNC: 32.2 G/DL (ref 31.5–35.7)
MCV RBC AUTO: 91.3 FL (ref 79–97)
PLATELET # BLD AUTO: 376 10*3/MM3 (ref 140–450)
PMV BLD AUTO: 10.2 FL (ref 6–12)
RBC # BLD AUTO: 3.57 10*6/MM3 (ref 4.14–5.8)
TRIGL SERPL-MCNC: 56 MG/DL (ref 0–150)
VLDLC SERPL-MCNC: 13 MG/DL (ref 5–40)
WBC NRBC COR # BLD AUTO: 12.93 10*3/MM3 (ref 3.4–10.8)

## 2024-01-15 PROCEDURE — 85027 COMPLETE CBC AUTOMATED: CPT

## 2024-01-15 PROCEDURE — 99213 OFFICE O/P EST LOW 20 MIN: CPT | Performed by: STUDENT IN AN ORGANIZED HEALTH CARE EDUCATION/TRAINING PROGRAM

## 2024-01-15 PROCEDURE — 80061 LIPID PANEL: CPT

## 2024-01-15 PROCEDURE — 36415 COLL VENOUS BLD VENIPUNCTURE: CPT

## 2024-01-15 RX ORDER — CYANOCOBALAMIN 1000 UG/ML
1000 INJECTION, SOLUTION INTRAMUSCULAR; SUBCUTANEOUS
Status: SHIPPED | OUTPATIENT
Start: 2024-02-15 | End: 2025-01-10

## 2024-01-15 RX ORDER — GUAIFENESIN AND DEXTROMETHORPHAN HYDROBROMIDE 600; 30 MG/1; MG/1
1 TABLET, EXTENDED RELEASE ORAL 2 TIMES DAILY PRN
Qty: 10 TABLET | Refills: 0 | Status: SHIPPED | OUTPATIENT
Start: 2024-01-15

## 2024-01-15 RX ORDER — MULTIPLE VITAMINS W/ MINERALS TAB 9MG-400MCG
1 TAB ORAL DAILY
Qty: 360 EACH | Refills: 0 | Status: SHIPPED | OUTPATIENT
Start: 2024-01-15

## 2024-01-15 RX ORDER — BENZONATATE 100 MG/1
100 CAPSULE ORAL 3 TIMES DAILY PRN
Qty: 12 CAPSULE | Refills: 0 | Status: SHIPPED | OUTPATIENT
Start: 2024-01-15

## 2024-01-15 RX ORDER — PANTOPRAZOLE SODIUM 40 MG/1
40 TABLET, DELAYED RELEASE ORAL
Qty: 90 TABLET | Refills: 0 | Status: SHIPPED | OUTPATIENT
Start: 2024-01-15 | End: 2024-04-14

## 2024-01-15 RX ORDER — FOLIC ACID 1 MG/1
1 TABLET ORAL DAILY
Qty: 90 TABLET | Refills: 1 | Status: SHIPPED | OUTPATIENT
Start: 2024-01-15

## 2024-01-15 NOTE — PROGRESS NOTES
Subjective:       Terry Tena is a 63 y.o. male who presents for follow-up for B12 deficiency anemia.    I first saw Mr. Tena for a posthospital discharge visit.  He had been admitted at the end of last year for symptomatic anemia requiring blood transfusions.  He was found to be profoundly deficient of vitamin B12.  He received initial intramuscular replacement in the hospital and we continued this for 1 month.  He is due to repeat CBC today.  Overall, his energy level is improved.      He has also seen hematology and they have ordered several tests to look for potential etiologies of his vitamin B12 deficiency.  They have started him on folic acid and iron.  He has upcoming appointment with them.  He request refills on those medications today and I have filled them at his request.    We will transition at this time from weekly shots to monthly shots.  Will follow-up in 1 month.    Mr. Tena has history of symptoms of dyspepsia.  He was previously prescribed Protonix and states this did help with his appetite.  He is out of this medication currently and says that since he has been out of it, he has had decreased appetite.  I will refill medication today but I did  him that long-term use can be associated with decreased B12 absorption and guidelines would recommend reassessing his need for this medication if he is on it long-term.    Mr. Tena is also recovering from a URI.  Approximately 2 weeks ago everyone in his household was sick.  At that time he reported fevers but this is since resolved.  Although he overall has recovered, he continues to have bothersome productive cough.  Denies fever or systemic signs of infection.  Lungs clear to auscultation bilaterally.  Would prescribe some symptomatic relief in the form of Mucinex DM and Tessalon Perles.  We did discuss potential signs and symptoms of pneumonia that would prompt him to follow-up sooner.      The following portions of the  patient's history were reviewed and updated as appropriate: allergies, current medications, past family history, past medical history, past social history, past surgical history, and problem list.    Past Medical Hx:  History reviewed. No pertinent past medical history.    Past Surgical Hx:  Past Surgical History:   Procedure Laterality Date    COLONOSCOPY N/A 12/11/2023    Procedure: COLONOSCOPY;  Surgeon: Alonzo Dow MD;  Location: [facility] ENDOSCOPY;  Service: Gastroenterology;  Laterality: N/A;  HEMORRHOIDS    ENDOSCOPY  12/9/2023    Procedure: ESOPHAGOGASTRODUODENOSCOPY AT BEDSIDE WITH ANESTHESIA AND BIOPSIES;  Surgeon: Alonzo Dow MD;  Location: [facility] ENDOSCOPY;  Service: Gastroenterology;;  ESOPHAGITIS, HIATAL HERNIA       Current Meds:    Current Outpatient Medications:     folic acid (FOLVITE) 1 MG tablet, Take 1 tablet by mouth Daily., Disp: 90 tablet, Rfl: 1    multivitamin with minerals tablet tablet, Take 1 tablet by mouth Daily., Disp: 360 each, Rfl: 0    nicotine (NICODERM CQ) 21 MG/24HR patch, Place 1 patch on the skin as directed by provider Daily for 60 doses., Disp: 60 patch, Rfl: 0    pantoprazole (PROTONIX) 40 MG EC tablet, Take 1 tablet by mouth Every Morning for 90 days., Disp: 90 tablet, Rfl: 0    benzonatate (Tessalon Perles) 100 MG capsule, Take 1 capsule by mouth 3 (Three) Times a Day As Needed for Cough., Disp: 12 capsule, Rfl: 0    guaifenesin-dextromethorphan (MUCINEX DM)  MG tablet sustained-release 12 hour tablet, Take 1 tablet by mouth 2 (Two) Times a Day As Needed (productive cough)., Disp: 10 tablet, Rfl: 0    Current Facility-Administered Medications:     [START ON 2/15/2024] cyanocobalamin injection 1,000 mcg, 1,000 mcg, Intramuscular, Q30 Days, Farooq Hernandez MD    Allergies:  No Known Allergies    Family Hx:  Family History   Problem Relation Age of Onset    Heart attack Brother     Cancer Mother         Social History:  Social History  "    Socioeconomic History    Marital status: Single   Tobacco Use    Smoking status: Former     Packs/day: 1.00     Years: 50.00     Additional pack years: 0.00     Total pack years: 50.00     Types: Cigarettes    Smokeless tobacco: Never   Vaping Use    Vaping Use: Never used   Substance and Sexual Activity    Alcohol use: Yes     Alcohol/week: 42.0 standard drinks of alcohol     Types: 42 Cans of beer per week    Drug use: Not Currently    Sexual activity: Defer       Review of Systems  Review of Systems   Constitutional:  Negative for chills and fever (resolved, one week ago).   Respiratory:  Positive for cough (productive cough, worse with sleep) and shortness of breath (slight worse than normal).    Gastrointestinal:  Negative for nausea and vomiting.   Neurological:  Negative for syncope and weakness.       Objective:     /70   Pulse 94   Ht 165.1 cm (65\")   Wt 54.9 kg (121 lb)   SpO2 98%   BMI 20.14 kg/m²   Physical Exam  Constitutional:       General: He is not in acute distress.     Appearance: Normal appearance. He is not ill-appearing, toxic-appearing or diaphoretic.   Cardiovascular:      Rate and Rhythm: Normal rate and regular rhythm.   Pulmonary:      Effort: Pulmonary effort is normal. No respiratory distress.      Breath sounds: Normal breath sounds. No stridor. No wheezing, rhonchi or rales.   Musculoskeletal:      Cervical back: Neck supple.   Lymphadenopathy:      Cervical: No cervical adenopathy.   Neurological:      Mental Status: He is alert.   Psychiatric:         Mood and Affect: Mood normal.         Behavior: Behavior normal.          Assessment/Plan:     Diagnoses and all orders for this visit:    1. Vitamin B12 deficiency (dietary) anemia (Primary)      I first saw Mr. Tena for a posthospital discharge visit.  He had been admitted at the end of last year for symptomatic anemia requiring blood transfusions.  He was found to be profoundly deficient of vitamin B12.  He " received initial intramuscular replacement in the hospital and we continued this for 1 month.  He is due to repeat CBC today.  Overall, his energy level is improved.      He has also seen hematology and they have ordered several tests to look for potential etiologies of his vitamin B12 deficiency.  They have started him on folic acid and iron.  He has upcoming appointment with them.  He request refills on those medications today and I have filled them at his request.    We will transition at this time from weekly shots to monthly shots.  Will follow-up in 1 month.    Mr. Tena has history of symptoms of dyspepsia.  He was previously prescribed Protonix and states this did help with his appetite.  He is out of this medication currently and says that since he has been out of it, he has had decreased appetite.  I will refill medication today but I did  him that long-term use can be associated with decreased B12 absorption and guidelines would recommend reassessing his need for this medication if he is on it long-term.        -     pantoprazole (PROTONIX) 40 MG EC tablet; Take 1 tablet by mouth Every Morning for 90 days.  Dispense: 90 tablet; Refill: 0  -     folic acid (FOLVITE) 1 MG tablet; Take 1 tablet by mouth Daily.  Dispense: 90 tablet; Refill: 1  -     multivitamin with minerals tablet tablet; Take 1 tablet by mouth Daily.  Dispense: 360 each; Refill: 0  -     cyanocobalamin injection 1,000 mcg    2. Productive cough    Mr. Tena is also recovering from a URI.  Approximately 2 weeks ago everyone in his household was sick.  At that time he reported fevers but this is since resolved.  Although he overall has recovered, he continues to have bothersome productive cough.  Denies fever or systemic signs of infection.  Lungs clear to auscultation bilaterally.  Would prescribe some symptomatic relief in the form of Mucinex DM and Tessalon Perles.  We did discuss potential signs and symptoms of pneumonia  that would prompt him to follow-up sooner.    -     benzonatate (Tessalon Perles) 100 MG capsule; Take 1 capsule by mouth 3 (Three) Times a Day As Needed for Cough.  Dispense: 12 capsule; Refill: 0  -     guaifenesin-dextromethorphan (MUCINEX DM)  MG tablet sustained-release 12 hour tablet; Take 1 tablet by mouth 2 (Two) Times a Day As Needed (productive cough).  Dispense: 10 tablet; Refill: 0          Rx changes: Short course of Tessalon Perles and Mucinex DM as needed for productive cough    Follow-up:     Return in about 1 month (around 2/15/2024) for Vitamin B12 anemia.    Preventative:  Health Maintenance   Topic Date Due    ANNUAL PHYSICAL  Never done    COVID-19 Vaccine (1) 01/16/2024 (Originally 2/4/1961)    INFLUENZA VACCINE  01/16/2024 (Originally 8/1/2023)    TDAP/TD VACCINES (1 - Tdap) 01/16/2024 (Originally 8/4/1978)    ZOSTER VACCINE (1 of 2) 01/16/2024 (Originally 8/4/2009)    COLORECTAL CANCER SCREENING  12/11/2033    HEPATITIS C SCREENING  Completed    Pneumococcal Vaccine 0-64  Aged Out         This document has been electronically signed by Farooq Hernandez MD on January 15, 2024 15:46 EST       Parts of this note are electronic transcriptions/translations of spoken language to printed text using the Dragon Dictation system.

## 2024-01-16 NOTE — PROGRESS NOTES
I have reviewed Mr. Tena's labwork.  His hemoglobin, which was 9.6 one month ago, has now improved to 10.5.  Although it is not normal yet, this represents a significant increase since he was hospitalized and shows he is responding appropriately to the vitamin B12 shots. He did not have elevated cholesterol.  His total cholesterol was 103 and his LDL cholesterol is 60.  Although some guidelines would recommend starting medicine to lower cholesterol to reduce his risk of heart attack and stroke with history of smoking, his LDL is already at the desired threshold for treatment.  At this point, I would focus more on continuing smoking cessation and repeat lipid panel next year, but if he is interested in medicine to further decrease risk of heart attack or stroke, we could consider a lower dose moderate intensity statin based on his preference.    Can we call him and discussed this?    Thank you,    Farooq Hernandez    ?  This document has been electronically signed by Farooq Hernandez MD on January 16, 2024 09:05 EST          ?  This document has been electronically signed by Farooq Hernandez MD on January 16, 2024 09:02 EST

## 2024-01-26 DIAGNOSIS — E78.5 HYPERLIPIDEMIA, UNSPECIFIED HYPERLIPIDEMIA TYPE: Primary | ICD-10-CM

## 2024-01-26 RX ORDER — ATORVASTATIN CALCIUM 10 MG/1
10 TABLET, FILM COATED ORAL DAILY
Qty: 90 TABLET | Refills: 1 | Status: SHIPPED | OUTPATIENT
Start: 2024-01-26

## 2024-02-16 ENCOUNTER — CLINICAL SUPPORT (OUTPATIENT)
Dept: FAMILY MEDICINE CLINIC | Facility: CLINIC | Age: 64
End: 2024-02-16
Payer: MEDICAID

## 2024-02-16 ENCOUNTER — TELEPHONE (OUTPATIENT)
Dept: FAMILY MEDICINE CLINIC | Facility: CLINIC | Age: 64
End: 2024-02-16
Payer: MEDICAID

## 2024-02-16 DIAGNOSIS — E53.8 B12 DEFICIENCY: Primary | ICD-10-CM

## 2024-02-21 RX ADMIN — CYANOCOBALAMIN 1000 MCG: 1000 INJECTION, SOLUTION INTRAMUSCULAR; SUBCUTANEOUS at 11:57

## 2024-03-11 ENCOUNTER — OFFICE VISIT (OUTPATIENT)
Dept: FAMILY MEDICINE CLINIC | Facility: CLINIC | Age: 64
End: 2024-03-11
Payer: MEDICAID

## 2024-03-11 VITALS — BODY MASS INDEX: 20.16 KG/M2 | WEIGHT: 121 LBS | HEIGHT: 65 IN

## 2024-03-11 DIAGNOSIS — Z23 NEED FOR DIPHTHERIA-TETANUS-PERTUSSIS (TDAP) VACCINE: Primary | ICD-10-CM

## 2024-03-11 PROBLEM — E78.5 HYPERLIPIDEMIA: Status: ACTIVE | Noted: 2024-03-11

## 2024-03-11 RX ORDER — FERROUS SULFATE 325(65) MG
1 TABLET ORAL DAILY
COMMUNITY
Start: 2024-02-21

## 2024-03-11 RX ADMIN — CYANOCOBALAMIN 1000 MCG: 1000 INJECTION, SOLUTION INTRAMUSCULAR; SUBCUTANEOUS at 16:34

## 2024-03-11 NOTE — PROGRESS NOTES
Subjective:       Terry Tena is a 63 y.o. male who presents for follow up for anemia.     Please see my prior notes in regard to this. In summary, Terry was hospitalized for anemia requiring blood transfusion last year and was found to have vitamin B12 deficiency. We initiated treatment with IM vitamin B12 and has now transitioned to receiving these shots monthly for a year. He is doing well today and denies symptoms of anemia. Outside labs from hematology confirm improvement of hemoglobin and vitamin B12 levels. In accordance with guidelines, would see him back in six months to repeat CBC.     Terry does report some diarrhea and GI discomfort. It may be related to iron replacement. He tells me he is scheduled to finish iron replacement in five days. I did offer KUB today but he declines as he prefers to see if symptoms resolve off iron. The protonix I had started did help his dyspepsia and appetite has improved. Will have him follow up PRN if symptoms fail to improve after iron replacement is stopped.       Past Medical Hx:  History reviewed. No pertinent past medical history.    Past Surgical Hx:  Past Surgical History:   Procedure Laterality Date    COLONOSCOPY N/A 12/11/2023    Procedure: COLONOSCOPY;  Surgeon: Alonzo Dow MD;  Location: [facility] ENDOSCOPY;  Service: Gastroenterology;  Laterality: N/A;  HEMORRHOIDS    ENDOSCOPY  12/9/2023    Procedure: ESOPHAGOGASTRODUODENOSCOPY AT BEDSIDE WITH ANESTHESIA AND BIOPSIES;  Surgeon: Alonzo Dow MD;  Location: [facility] ENDOSCOPY;  Service: Gastroenterology;;  ESOPHAGITIS, HIATAL HERNIA       Current Meds:    Current Outpatient Medications:     FeroSul 325 (65 Fe) MG tablet, Take 1 tablet by mouth Daily., Disp: , Rfl:     folic acid (FOLVITE) 1 MG tablet, Take 1 tablet by mouth Daily., Disp: 90 tablet, Rfl: 1    pantoprazole (PROTONIX) 40 MG EC tablet, Take 1 tablet by mouth Every Morning for 90 days., Disp: 90 tablet, Rfl: 0     "atorvastatin (Lipitor) 10 MG tablet, Take 1 tablet by mouth Daily. (Patient not taking: Reported on 3/11/2024), Disp: 90 tablet, Rfl: 1    multivitamin with minerals tablet tablet, Take 1 tablet by mouth Daily. (Patient not taking: Reported on 3/11/2024), Disp: 360 each, Rfl: 0    Current Facility-Administered Medications:     cyanocobalamin injection 1,000 mcg, 1,000 mcg, Intramuscular, Q30 Days, Farooq Hernandez MD, 1,000 mcg at 03/11/24 1634    Allergies:  No Known Allergies    Family Hx:  Family History   Problem Relation Age of Onset    Heart attack Brother     Cancer Mother         Social History:  Social History     Socioeconomic History    Marital status: Single   Tobacco Use    Smoking status: Former     Current packs/day: 1.00     Average packs/day: 1 pack/day for 50.0 years (50.0 ttl pk-yrs)     Types: Cigarettes    Smokeless tobacco: Never   Vaping Use    Vaping status: Never Used   Substance and Sexual Activity    Alcohol use: Yes     Alcohol/week: 42.0 standard drinks of alcohol     Types: 42 Cans of beer per week    Drug use: Not Currently    Sexual activity: Defer       Review of Systems  Review of Systems   Constitutional:  Negative for fatigue.   Respiratory:  Negative for shortness of breath.    Gastrointestinal:  Positive for abdominal pain and diarrhea. Negative for constipation.       Objective:     Ht 165.1 cm (65\")   Wt 54.9 kg (121 lb)   BMI 20.14 kg/m²   Physical Exam  Constitutional:       General: He is not in acute distress.     Appearance: Normal appearance. He is normal weight. He is not ill-appearing, toxic-appearing or diaphoretic.   Pulmonary:      Effort: Pulmonary effort is normal. No respiratory distress.   Neurological:      Mental Status: He is alert.   Psychiatric:         Mood and Affect: Mood normal.         Behavior: Behavior normal.          Assessment/Plan:     Diagnoses and all orders for this visit:    1. Need for diphtheria-tetanus-pertussis (Tdap) vaccine " (Primary)  -     Tdap Vaccine => 6yo IM (BOOSTRIX)      2. Vitamin B12 deficiency anemia       Please see my prior notes in regard to this. In summary, Terry was hospitalized for anemia requiring blood transfusion last year and was found to have vitamin B12 deficiency. We initiated treatment with IM vitamin B12 and has now transitioned to receiving these shots monthly for a year. He is doing well today and denies symptoms of anemia. Outside labs from hematology confirm improvement of hemoglobin and vitamin B12 levels. In accordance with guidelines, would see him back in six months to repeat CBC.     Rx changes: None    Follow-up:     Return in about 6 months (around 9/11/2024) for Anemia Recheck .    Preventative:  Health Maintenance   Topic Date Due    RSV Vaccine - Adults (1 - 1-dose 60+ series) Never done    ANNUAL PHYSICAL  Never done    COVID-19 Vaccine (1 - 2023-24 season) 03/12/2024 (Originally 9/1/2023)    INFLUENZA VACCINE  03/12/2024 (Originally 8/1/2023)    ZOSTER VACCINE (1 of 2) 03/12/2024 (Originally 8/4/2009)    LIPID PANEL  01/15/2025    COLORECTAL CANCER SCREENING  12/11/2033    TDAP/TD VACCINES (2 - Td or Tdap) 03/11/2034    HEPATITIS C SCREENING  Completed    Pneumococcal Vaccine 0-64  Aged Out         This document has been electronically signed by Farooq Hernandez MD on March 11, 2024 20:26 EDT       Parts of this note are electronic transcriptions/translations of spoken language to printed text using the Dragon Dictation system.

## 2024-10-12 ENCOUNTER — HOSPITAL ENCOUNTER (EMERGENCY)
Facility: HOSPITAL | Age: 64
Discharge: HOME | End: 2024-10-12
Attending: EMERGENCY MEDICINE
Payer: MEDICARE

## 2024-10-12 VITALS
TEMPERATURE: 98.2 F | OXYGEN SATURATION: 95 % | SYSTOLIC BLOOD PRESSURE: 126 MMHG | WEIGHT: 110.23 LBS | HEART RATE: 77 BPM | RESPIRATION RATE: 16 BRPM | HEIGHT: 66 IN | DIASTOLIC BLOOD PRESSURE: 79 MMHG | BODY MASS INDEX: 17.72 KG/M2

## 2024-10-12 DIAGNOSIS — R06.6 INTRACTABLE HICCUPS: Primary | ICD-10-CM

## 2024-10-12 PROCEDURE — 99283 EMERGENCY DEPT VISIT LOW MDM: CPT

## 2024-10-12 RX ORDER — CHLORPROMAZINE HYDROCHLORIDE 25 MG/1
25 TABLET, FILM COATED ORAL 3 TIMES DAILY
Qty: 9 TABLET | Refills: 0 | Status: SHIPPED | OUTPATIENT
Start: 2024-10-12 | End: 2024-10-15

## 2024-10-12 RX ORDER — CHLORPROMAZINE HYDROCHLORIDE 25 MG/1
25 TABLET, FILM COATED ORAL ONCE
Status: COMPLETED | OUTPATIENT
Start: 2024-10-12 | End: 2024-10-12

## 2024-10-12 RX ADMIN — CHLORPROMAZINE HYDROCHLORIDE 25 MG: 25 TABLET, FILM COATED ORAL at 21:45

## 2024-10-13 NOTE — ED PROVIDER NOTES
Time: 8:40 PM EDT  Date of encounter:  10/12/2024  Independent Historian/Clinical History and Information was obtained by:   Patient and Family  JERRY #[id number]    History is limited by: Language Barrier    Chief Complaint: HICCUPS      History of Present Illness:    The patient is a 64 y.o. year old male who presents to the emergency department for evaluation of hiccups that he states started greater than 24 hours ago.  He states he had this once before about 20 years ago when he was in [place] but they only lasted 6 hours and was self-limiting.  He denies any abdominal pain or chest pain.  He reports no recent fevers or illnesses.  He denies any swelling to his ankles or feet.  He reports no leg or calf pain.  He states that he does have a bad history of reflux and GERD and states that he does take medications daily for this.  He denies any increase in his reflux or GERD symptoms.  He states he has been compliant with his medications.  He reports no recent cough or fevers.      Patient Care Team  Primary Care Provider: Provider, No Known    Past Medical History:     No Known Allergies  History reviewed. No pertinent past medical history.  Past Surgical History:   Procedure Laterality Date    COLONOSCOPY N/A 12/11/2023    Procedure: COLONOSCOPY;  Surgeon: Alonzo Dow MD;  Location: [facility] ENDOSCOPY;  Service: Gastroenterology;  Laterality: N/A;  HEMORRHOIDS    ENDOSCOPY  12/9/2023    Procedure: ESOPHAGOGASTRODUODENOSCOPY AT BEDSIDE WITH ANESTHESIA AND BIOPSIES;  Surgeon: Alonzo Dow MD;  Location: [facility] ENDOSCOPY;  Service: Gastroenterology;;  ESOPHAGITIS, HIATAL HERNIA     Family History   Problem Relation Age of Onset    Heart attack Brother     Cancer Mother        Home Medications:  Prior to Admission medications    Medication Sig Start Date End Date Taking? Authorizing Provider   atorvastatin (Lipitor) 10 MG tablet Take 1 tablet by mouth Daily.  Patient not taking: Reported on  3/11/2024 1/26/24   Farooq Hernandez MD   diclofenac (VOLTAREN) 75 MG EC tablet Take 1 tablet by mouth 2 (Two) Times a Day As Needed (Pain). 6/13/24   Aguila Rodrigues DO   esomeprazole (nexIUM) 40 MG capsule Take 1 capsule by mouth Every Morning Before Breakfast. 6/29/24   Napoleon Vaughan DO   FeroSul 325 (65 Fe) MG tablet Take 1 tablet by mouth Daily. 2/21/24   Best Dey MD   folic acid (FOLVITE) 1 MG tablet Take 1 tablet by mouth Daily. 1/15/24   Farooq Hernandez MD   multivitamin with minerals tablet tablet Take 1 tablet by mouth Daily.  Patient not taking: Reported on 3/11/2024 1/15/24   Farooq Hernandez MD   ondansetron ODT (ZOFRAN-ODT) 4 MG disintegrating tablet Place 1 tablet on the tongue Every 8 (Eight) Hours As Needed for Vomiting or Nausea. 6/29/24   Napoleon Vaughan DO   sucralfate (CARAFATE) 1 g tablet Take 1 tablet by mouth 4 (Four) Times a Day. 6/29/24   Napoleon Vaughan DO        Social History:   Social History     Tobacco Use    Smoking status: Former     Current packs/day: 1.00     Average packs/day: 1 pack/day for 50.0 years (50.0 ttl pk-yrs)     Types: Cigarettes    Smokeless tobacco: Never   Vaping Use    Vaping status: Never Used   Substance Use Topics    Alcohol use: Yes     Alcohol/week: 42.0 standard drinks of alcohol     Types: 42 Cans of beer per week    Drug use: Not Currently         Review of Systems:  Review of Systems   Constitutional:  Negative for chills and fever.   HENT:  Negative for congestion, ear pain and sore throat.    Eyes:  Negative for pain.   Respiratory:  Negative for cough, chest tightness, shortness of breath and wheezing.    Cardiovascular:  Negative for chest pain, palpitations and leg swelling.   Gastrointestinal:  Negative for abdominal pain, diarrhea, nausea and vomiting.   Genitourinary:  Negative for dysuria, flank pain and hematuria.   Musculoskeletal:  Negative for back pain, joint swelling, neck pain and neck stiffness.   Skin:  Negative for  "pallor and rash.   Neurological:  Negative for seizures and headaches.   All other systems reviewed and are negative.       Physical Exam:  /79 (BP Location: Right arm, Patient Position: Sitting)   Pulse 77   Temp 98.2 °F (36.8 °C) (Oral)   Resp 16   Ht 167.6 cm (66\")   Wt 50 kg (110 lb 3.7 oz)   SpO2 95%   BMI 17.79 kg/m²     Physical Exam  Vitals and nursing note reviewed.   Constitutional:       General: He is not in acute distress.     Appearance: Normal appearance. He is not ill-appearing or toxic-appearing.   HENT:      Head: Normocephalic and atraumatic.      Mouth/Throat:      Mouth: Mucous membranes are moist.      Pharynx: Oropharynx is clear.   Eyes:      General: No scleral icterus.     Conjunctiva/sclera: Conjunctivae normal.      Pupils: Pupils are equal, round, and reactive to light.   Cardiovascular:      Rate and Rhythm: Normal rate and regular rhythm.      Pulses: Normal pulses.   Pulmonary:      Effort: Pulmonary effort is normal. No respiratory distress.      Breath sounds: Normal breath sounds. No wheezing.   Abdominal:      General: Abdomen is flat.      Palpations: Abdomen is soft.      Tenderness: There is no abdominal tenderness. There is no guarding or rebound.   Musculoskeletal:         General: No swelling or tenderness. Normal range of motion.      Cervical back: Normal range of motion and neck supple. No rigidity or tenderness.      Right lower leg: No edema.      Left lower leg: No edema.   Lymphadenopathy:      Cervical: No cervical adenopathy.   Skin:     General: Skin is warm and dry.      Capillary Refill: Capillary refill takes less than 2 seconds.      Findings: No rash.   Neurological:      General: No focal deficit present.      Mental Status: He is alert and oriented to person, place, and time. Mental status is at baseline.   Psychiatric:         Mood and Affect: Mood normal.         Behavior: Behavior normal.                Procedures:  Procedures      Medical " Decision Making:      Comorbidities that affect care:    GERD    External Notes reviewed:    None      The following orders were placed and all results were independently analyzed by me:  No orders of the defined types were placed in this encounter.      Medications Given in the Emergency Department:  Medications   chlorproMAZINE (THORAZINE) tablet 25 mg (25 mg Oral Given 10/12/24 1152)        ED Course:         Labs:    Lab Results (last 24 hours)       ** No results found for the last 24 hours. **             Imaging:    No Radiology Exams Resulted Within Past 24 Hours      Differential Diagnosis and Discussion:    Hiccups, abdominal pain, chest pain      MDM  Number of Diagnoses or Management Options  Intractable hiccups: new and requires workup  Risk of Complications, Morbidity, and/or Mortality  Presenting problems: low  Diagnostic procedures: low  Management options: low    Patient Progress  Patient progress: stable             Patient Care Considerations:    LABS: I considered ordering labs, however considering the patient's complaint in stable condition I did not feel it was necessary at this time.      Consultants/Shared Management Plan:    I did consult with shanice Crabtree concerning the dosage for the Thorazine in the home prescription dosage.    Social Determinants of Health:    Patient is independent, reliable, and has access to care.       Disposition and Care Coordination:    Discharged: The patient is suitable and stable for discharge with no need for consideration of admission.    I have explained the patient´s condition, diagnoses and treatment plan based on the information available to me at this time. I have answered questions and addressed any concerns. The patient has a good  understanding of the patient´s diagnosis, condition, and treatment plan as can be expected at this point. The vital signs have been stable. The patient´s condition is stable and appropriate for discharge from the  emergency department.      The patient will pursue further outpatient evaluation with the primary care physician or other designated or consulting physician as outlined in the discharge instructions. They are agreeable to this plan of care and follow-up instructions have been explained in detail. The patient has received these instructions in written format and has expressed an understanding of the discharge instructions. The patient is aware that any significant change in condition or worsening of symptoms should prompt an immediate return to this or the closest emergency department or call to 911.  I have explained discharge medications and the need for follow up with the patient/caretakers. This was also printed in the discharge instructions. Patient was discharged with the following medications and follow up:      Medication List        New Prescriptions      chlorproMAZINE 25 MG tablet  Commonly known as: THORAZINE  Take 1 tablet by mouth 3 (Three) Times a Day for 3 days.               Where to Get Your Medications        These medications were sent to [facility]/pharmacy #[id number] - [place], [place] - [address] - [phone] [facility] [phone] FX  [address] [place] [address]      Hours: 24-hours Phone: [phone]   chlorproMAZINE 25 MG tablet      Farooq Hernandez MD  [address] [facility] [address]  [place] [id number] [phone]    Call   MONDAY, FOR FOLLOW UP       Final diagnoses:   Intractable hiccups        ED Disposition       ED Disposition   Discharge    Condition   Stable    Comment   --               This medical record created using voice recognition software.             Doreen Aguilar, MAYURI  10/12/24 1346

## 2024-10-13 NOTE — DISCHARGE INSTRUCTIONS
Rest, drink plenty of fluids.  Take your meds as prescribed.  You may take over-the-counter acetaminophen and Motrin as needed for aches and pains.  Follow-up with your primary care office on Monday or Tuesday to assure that your symptoms are improving with rest, time, and medications.  Discussed with them further testing is needed if your symptoms are not improving.  Return to the emergency department immediately for any acutely developing respiratory distress, any airway difficulties, any persistent vomiting or any new or worse concerns.

## (undated) DEVICE — LINER SURG CANSTR SXN S/RIGD 1500CC

## (undated) DEVICE — BLCK/BITE BLOX WO/DENTL/RIM W/STRAP 54F

## (undated) DEVICE — SINGLE-USE BIOPSY FORCEPS: Brand: RADIAL JAW 4

## (undated) DEVICE — CONN JET HYDRA H20 AUXILIARY DISP

## (undated) DEVICE — SOLIDIFIER LIQLOC PLS 1500CC BT

## (undated) DEVICE — SOL IRRG H2O PL/BG 1000ML STRL

## (undated) DEVICE — Device: Brand: DEFENDO AIR/WATER/SUCTION AND BIOPSY VALVE

## (undated) DEVICE — Device